# Patient Record
Sex: MALE | Race: WHITE | Employment: OTHER | URBAN - METROPOLITAN AREA
[De-identification: names, ages, dates, MRNs, and addresses within clinical notes are randomized per-mention and may not be internally consistent; named-entity substitution may affect disease eponyms.]

---

## 2018-07-25 ENCOUNTER — TELEPHONE (OUTPATIENT)
Dept: ORTHOPEDIC SURGERY | Age: 78
End: 2018-07-25

## 2018-07-25 DIAGNOSIS — R52 PAIN: Primary | ICD-10-CM

## 2020-06-23 ENCOUNTER — OFFICE VISIT (OUTPATIENT)
Dept: PRIMARY CARE CLINIC | Age: 80
End: 2020-06-23
Payer: MEDICARE

## 2020-06-23 VITALS
OXYGEN SATURATION: 95 % | TEMPERATURE: 98.8 F | SYSTOLIC BLOOD PRESSURE: 138 MMHG | DIASTOLIC BLOOD PRESSURE: 72 MMHG | HEIGHT: 74 IN | WEIGHT: 256 LBS | RESPIRATION RATE: 20 BRPM | BODY MASS INDEX: 32.85 KG/M2 | HEART RATE: 78 BPM

## 2020-06-23 PROBLEM — Z86.73 HISTORY OF CVA (CEREBROVASCULAR ACCIDENT): Status: ACTIVE | Noted: 2020-06-23

## 2020-06-23 PROBLEM — E11.9 DIABETES MELLITUS (HCC): Status: ACTIVE | Noted: 2017-08-01

## 2020-06-23 PROBLEM — J43.9 EMPHYSEMA OF LUNG (HCC): Status: ACTIVE | Noted: 2020-06-23

## 2020-06-23 PROBLEM — I10 ESSENTIAL HYPERTENSION: Status: ACTIVE | Noted: 2020-06-23

## 2020-06-23 PROCEDURE — 99203 OFFICE O/P NEW LOW 30 MIN: CPT | Performed by: FAMILY MEDICINE

## 2020-06-23 RX ORDER — PRAMIPEXOLE DIHYDROCHLORIDE 0.12 MG/1
TABLET ORAL
COMMUNITY
Start: 2020-06-18 | End: 2021-01-20 | Stop reason: SDUPTHER

## 2020-06-23 ASSESSMENT — PATIENT HEALTH QUESTIONNAIRE - PHQ9
2. FEELING DOWN, DEPRESSED OR HOPELESS: 0
1. LITTLE INTEREST OR PLEASURE IN DOING THINGS: 0
SUM OF ALL RESPONSES TO PHQ9 QUESTIONS 1 & 2: 0
SUM OF ALL RESPONSES TO PHQ QUESTIONS 1-9: 0
SUM OF ALL RESPONSES TO PHQ QUESTIONS 1-9: 0

## 2020-06-23 ASSESSMENT — ENCOUNTER SYMPTOMS
NAUSEA: 0
WHEEZING: 0
VOMITING: 0
SHORTNESS OF BREATH: 1

## 2020-06-23 NOTE — PROGRESS NOTES
[address] [facility] presents to the office today for   Chief Complaint   Patient presents with   [address] to [facility] to be closer to his daughter  Shivam Naranjo back to [place] a few weeks ago with his wife (they had split up for 1.5 years ago)    Previous PCP Sejal WILDE    Diabetes  Sugars in [place] 200s  Back in [place] 80s-140s - he is eating healthier and less stress  Lantus 38 units per night  Humalog TID PRN sliding scale    Hypertension  Taking Amlodipine and Losartan  No chest pain or dyspnea    Emphysema  Quit smoking many years  Advair daily and albuterol prn    Stroke 40 years ago  Cannot provide me many details  He describes temporary paralysis    Labs 1 month ago he reports stable in [place]    Review of Systems   Constitutional: Negative for chills and fever. Respiratory: Positive for shortness of breath. Negative for wheezing. Cardiovascular: Negative for chest pain and palpitations. Gastrointestinal: Negative for nausea and vomiting. Genitourinary: Negative for dysuria, hematuria and urgency. Skin: Negative for rash. Neurological: Negative for dizziness and light-headedness. /72   Pulse 78   Temp 98.8 °F (37.1 °C) (Temporal)   Resp 20   Ht 6' 2\" (1.88 m)   Wt 256 lb (116.1 kg)   SpO2 95%   BMI 32.87 kg/m²   Physical Exam  Constitutional:       Appearance: Normal appearance. HENT:      Head: Normocephalic and atraumatic. Nose: Nose normal.      Mouth/Throat:      Mouth: Mucous membranes are moist.      Pharynx: No posterior oropharyngeal erythema. Eyes:      Extraocular Movements: Extraocular movements intact. Conjunctiva/sclera: Conjunctivae normal.   Cardiovascular:      Rate and Rhythm: Normal rate. Heart sounds: Normal heart sounds. Pulmonary:      Effort: Pulmonary effort is normal.      Breath sounds: Wheezing present. Skin:     General: Skin is warm.    Neurological:      Mental Status: He is alert and oriented to person, place, and time. Psychiatric:         Mood and Affect: Mood normal.         Behavior: Behavior normal.            Current Outpatient Medications:     pramipexole (MIRAPEX) 0.125 MG tablet, TAKE 2 TABLETS BY MOUTH 2 TO 3 HOURS BEFORE BEDTIME FOR RESTLESS LEGS, Disp: , Rfl:     insulin NPH (HUMULIN N;NOVOLIN N) 100 UNIT/ML injection vial, Inject into the skin 2 times daily (before meals) 20 units in am and 15 units at night, Disp: , Rfl:     fluticasone-salmeterol (ADVAIR) 500-50 MCG/DOSE diskus inhaler, Inhale 1 puff into the lungs every 12 hours To use dos and bring, Disp: , Rfl:     albuterol (PROVENTIL) (2.5 MG/3ML) 0.083% nebulizer solution, Take 2.5 mg by nebulization every 6 hours as needed for Wheezing To use dos if needs, Disp: , Rfl:     glipiZIDE (GLUCOTROL) 5 MG tablet, Take 5 mg by mouth 2 times daily (before meals), Disp: , Rfl:     gabapentin (NEURONTIN) 300 MG capsule, Take 300 mg by mouth nightly , Disp: , Rfl:     amLODIPine (NORVASC) 5 MG tablet, Take 5 mg by mouth daily Instructed to take with sip water am of procedure, Disp: , Rfl:     potassium chloride (KLOR-CON M) 20 MEQ extended release tablet, Take 20 mEq by mouth 2 times daily, Disp: , Rfl:     atorvastatin (LIPITOR) 20 MG tablet, Take 20 mg by mouth daily, Disp: , Rfl:     losartan (COZAAR) 100 MG tablet, Take 100 mg by mouth daily, Disp: , Rfl:      Past Medical History:   Diagnosis Date    Bronchitis     recent, abx completed, has minimal cough at this time    Carpal tunnel syndrome, right     COPD (chronic obstructive pulmonary disease) (HCC)     Diabetes (HCC)     Foot drop, left     dt nerve damage    Hyperlipidemia     Hypertension     Neuropathy     left leg       Marcellus Burns was seen today for establish care.     Diagnoses and all orders for this visit:    Type 2 diabetes mellitus without complication, with long-term current use of insulin ([facility])  -     Cancel: CBC; Future  -     Cancel: Hemoglobin A1C; Future  - Cancel: Lipid Panel; Future  -     Cancel: TSH without Reflex; Future  -     Cancel: Comprehensive Metabolic Panel; Future  -     CBC; Future  -     Comprehensive Metabolic Panel; Future  -     Hemoglobin A1C; Future  -     Lipid Panel; Future  -     TSH without Reflex; Future    Pulmonary emphysema, unspecified emphysema type ([facility] [address]    Essential hypertension  -     Cancel: Lipid Panel; Future  -     Cancel: Comprehensive Metabolic Panel; Future  -     Comprehensive Metabolic Panel; Future  -     Lipid Panel;  Future    History of CVA (cerebrovascular accident)       Labs and me in 3 months  He reports breathing is at baseline  No longer smoker x many decades  Release of records from previous PCP    Mya Malcolm MD

## 2020-07-24 ENCOUNTER — OFFICE VISIT (OUTPATIENT)
Dept: PRIMARY CARE CLINIC | Age: 80
End: 2020-07-24
Payer: MEDICARE

## 2020-07-24 VITALS
OXYGEN SATURATION: 93 % | SYSTOLIC BLOOD PRESSURE: 154 MMHG | WEIGHT: 254 LBS | HEART RATE: 80 BPM | BODY MASS INDEX: 34.4 KG/M2 | HEIGHT: 72 IN | RESPIRATION RATE: 20 BRPM | DIASTOLIC BLOOD PRESSURE: 64 MMHG | TEMPERATURE: 97.5 F

## 2020-07-24 PROCEDURE — 99214 OFFICE O/P EST MOD 30 MIN: CPT | Performed by: FAMILY MEDICINE

## 2020-07-24 RX ORDER — INSULIN GLARGINE 100 [IU]/ML
38 INJECTION, SOLUTION SUBCUTANEOUS NIGHTLY
Qty: 5 PEN | Refills: 3 | Status: SHIPPED
Start: 2020-07-24 | End: 2020-12-31 | Stop reason: SDUPTHER

## 2020-07-24 RX ORDER — TRIAMCINOLONE ACETONIDE 1 MG/G
CREAM TOPICAL
Qty: 15 G | Refills: 1 | Status: SHIPPED
Start: 2020-07-24 | End: 2021-01-20

## 2020-07-24 NOTE — PROGRESS NOTES
[address] [facility] presents to the office today for   Chief Complaint   Patient presents with    Hypertension    Diabetes    Follow-up     Legs and feet  with itchy rash  X few days  He thinks poison ivy  Weight is stable  No orthopnea  No pain  Swelling at baseline  He reports negative dopplers recently at [address] - I do not have records    Review of Systems        BP (!) 154/64 (Site: Left Upper Arm, Position: Sitting, Cuff Size: Medium Adult)   Pulse 80   Temp 97.5 °F (36.4 °C) (Temporal)   Resp 20   Ht 6' (1.829 m)   Wt 254 lb (115.2 kg)   SpO2 93%   BMI 34.45 kg/m²   Physical Exam  Constitutional:       Appearance: Normal appearance. HENT:      Head: Normocephalic and atraumatic. Nose: Nose normal.      Mouth/Throat:      Mouth: Mucous membranes are moist.      Pharynx: No posterior oropharyngeal erythema. Eyes:      Extraocular Movements: Extraocular movements intact. Conjunctiva/sclera: Conjunctivae normal.   Cardiovascular:      Rate and Rhythm: Normal rate. Heart sounds: Normal heart sounds. Pulmonary:      Effort: Pulmonary effort is normal.      Breath sounds: Normal breath sounds. Skin:     General: Skin is warm. Neurological:      Mental Status: He is alert and oriented to person, place, and time. Psychiatric:         Mood and Affect: Mood normal.         Behavior: Behavior normal.            Current Outpatient Medications:     insulin glargine (LANTUS SOLOSTAR) 100 UNIT/ML injection pen, Inject 38 Units into the skin nightly, Disp: 5 pen, Rfl: 3    triamcinolone (KENALOG) 0.1 % cream, Apply topically 2 times daily. , Disp: 15 g, Rfl: 1    pramipexole (MIRAPEX) 0.125 MG tablet, TAKE 2 TABLETS BY MOUTH 2 TO 3 HOURS BEFORE BEDTIME FOR RESTLESS LEGS, Disp: , Rfl:     insulin NPH (HUMULIN N;NOVOLIN N) 100 UNIT/ML injection vial, Inject into the skin 2 times daily (before meals) 20 units in am and 15 units at night, Disp: , Rfl:    fluticasone-salmeterol (ADVAIR) 500-50 MCG/DOSE diskus inhaler, Inhale 1 puff into the lungs every 12 hours To use dos and bring, Disp: , Rfl:     albuterol (PROVENTIL) (2.5 MG/3ML) 0.083% nebulizer solution, Take 2.5 mg by nebulization every 6 hours as needed for Wheezing To use dos if needs, Disp: , Rfl:     glipiZIDE (GLUCOTROL) 5 MG tablet, Take 5 mg by mouth 2 times daily (before meals), Disp: , Rfl:     gabapentin (NEURONTIN) 300 MG capsule, Take 300 mg by mouth nightly , Disp: , Rfl:     amLODIPine (NORVASC) 5 MG tablet, Take 5 mg by mouth daily Instructed to take with sip water am of procedure, Disp: , Rfl:     potassium chloride (KLOR-CON M) 20 MEQ extended release tablet, Take 20 mEq by mouth 2 times daily, Disp: , Rfl:     atorvastatin (LIPITOR) 20 MG tablet, Take 20 mg by mouth daily, Disp: , Rfl:     losartan (COZAAR) 100 MG tablet, Take 100 mg by mouth daily, Disp: , Rfl:      Past Medical History:   Diagnosis Date    Bronchitis     recent, abx completed, has minimal cough at this time    Carpal tunnel syndrome, right     COPD (chronic obstructive pulmonary disease) (HCC)     Diabetes ([facility] [address]     Foot drop, left     dt nerve damage    Hyperlipidemia     Hypertension     Neuropathy     left leg       Eloisa Shearer was seen today for hypertension, diabetes and follow-up. Diagnoses and all orders for this visit:    Poison ivy  -     triamcinolone (KENALOG) 0.1 % cream; Apply topically 2 times daily. Essential hypertension    Other orders  -     insulin glargine (LANTUS SOLOSTAR) 100 UNIT/ML injection pen;  Inject 38 Units into the skin nightly       Seems c/w poison ivy  Trial topical triamcinolone  Blood pressure mildly elevated so will defer oral steroids  See me in 1 week for recheck on blood pressure and rash    Micah Moore MD

## 2020-08-19 ENCOUNTER — HOSPITAL ENCOUNTER (OUTPATIENT)
Age: 80
Discharge: HOME | End: 2020-08-21
Payer: MEDICARE

## 2020-08-19 LAB
ALBUMIN SERPL-MCNC: 3.7 G/DL (ref 3.5–5.2)
ALP BLD-CCNC: 68 U/L (ref 40–129)
ALT SERPL-CCNC: 13 U/L (ref 0–40)
ANION GAP SERPL CALCULATED.3IONS-SCNC: 15 MMOL/L (ref 7–16)
AST SERPL-CCNC: 18 U/L (ref 0–39)
BILIRUB SERPL-MCNC: 0.4 MG/DL (ref 0–1.2)
BUN BLDV-MCNC: 30 MG/DL (ref 8–23)
CALCIUM SERPL-MCNC: 8.8 MG/DL (ref 8.6–10.2)
CHLORIDE BLD-SCNC: 105 MMOL/L (ref 98–107)
CHOLESTEROL, TOTAL: 107 MG/DL (ref 0–199)
CO2: 23 MMOL/L (ref 22–29)
CREAT SERPL-MCNC: 2 MG/DL (ref 0.7–1.2)
GFR AFRICAN AMERICAN: 39
GFR NON-AFRICAN AMERICAN: 32 ML/MIN/1.73
GLUCOSE BLD-MCNC: 145 MG/DL (ref 74–99)
HBA1C MFR BLD: 7.1 % (ref 4–5.6)
HCT VFR BLD CALC: 38 % (ref 37–54)
HDLC SERPL-MCNC: 43 MG/DL
HEMOGLOBIN: 11.9 G/DL (ref 12.5–16.5)
LDL CHOLESTEROL CALCULATED: 41 MG/DL (ref 0–99)
MCH RBC QN AUTO: 28.2 PG (ref 26–35)
MCHC RBC AUTO-ENTMCNC: 31.3 % (ref 32–34.5)
MCV RBC AUTO: 90 FL (ref 80–99.9)
PDW BLD-RTO: 15.4 FL (ref 11.5–15)
PLATELET # BLD: 196 E9/L (ref 130–450)
PMV BLD AUTO: 11.3 FL (ref 7–12)
POTASSIUM SERPL-SCNC: 4.1 MMOL/L (ref 3.5–5)
RBC # BLD: 4.22 E12/L (ref 3.8–5.8)
SODIUM BLD-SCNC: 143 MMOL/L (ref 132–146)
TOTAL PROTEIN: 6.4 G/DL (ref 6.4–8.3)
TRIGL SERPL-MCNC: 113 MG/DL (ref 0–149)
TSH SERPL DL<=0.05 MIU/L-ACNC: 3.76 UIU/ML (ref 0.27–4.2)
VLDLC SERPL CALC-MCNC: 23 MG/DL
WBC # BLD: 7.1 E9/L (ref 4.5–11.5)

## 2020-08-19 PROCEDURE — 84443 ASSAY THYROID STIM HORMONE: CPT

## 2020-08-19 PROCEDURE — 83036 HEMOGLOBIN GLYCOSYLATED A1C: CPT

## 2020-08-19 PROCEDURE — 85027 COMPLETE CBC AUTOMATED: CPT

## 2020-08-19 PROCEDURE — 80053 COMPREHEN METABOLIC PANEL: CPT

## 2020-08-19 PROCEDURE — 80061 LIPID PANEL: CPT

## 2020-08-19 PROCEDURE — 36415 COLL VENOUS BLD VENIPUNCTURE: CPT

## 2020-08-26 ENCOUNTER — OFFICE VISIT (OUTPATIENT)
Dept: PRIMARY CARE CLINIC | Age: 80
End: 2020-08-26
Payer: MEDICARE

## 2020-08-26 ENCOUNTER — HOSPITAL ENCOUNTER (OUTPATIENT)
Age: 80
Discharge: HOME | End: 2020-08-28
Payer: MEDICARE

## 2020-08-26 VITALS
WEIGHT: 254 LBS | OXYGEN SATURATION: 98 % | BODY MASS INDEX: 34.4 KG/M2 | RESPIRATION RATE: 18 BRPM | SYSTOLIC BLOOD PRESSURE: 175 MMHG | TEMPERATURE: 98.4 F | HEIGHT: 72 IN | DIASTOLIC BLOOD PRESSURE: 78 MMHG | HEART RATE: 69 BPM

## 2020-08-26 LAB
ANION GAP SERPL CALCULATED.3IONS-SCNC: 17 MMOL/L (ref 7–16)
BUN BLDV-MCNC: 29 MG/DL (ref 8–23)
CALCIUM SERPL-MCNC: 9.6 MG/DL (ref 8.6–10.2)
CHLORIDE BLD-SCNC: 103 MMOL/L (ref 98–107)
CO2: 23 MMOL/L (ref 22–29)
CREAT SERPL-MCNC: 2 MG/DL (ref 0.7–1.2)
GFR AFRICAN AMERICAN: 39
GFR NON-AFRICAN AMERICAN: 32 ML/MIN/1.73
GLUCOSE BLD-MCNC: 232 MG/DL (ref 74–99)
POTASSIUM SERPL-SCNC: 4.8 MMOL/L (ref 3.5–5)
PRO-BNP: 1815 PG/ML (ref 0–450)
SODIUM BLD-SCNC: 143 MMOL/L (ref 132–146)

## 2020-08-26 PROCEDURE — 3051F HG A1C>EQUAL 7.0%<8.0%: CPT | Performed by: FAMILY MEDICINE

## 2020-08-26 PROCEDURE — 80048 BASIC METABOLIC PNL TOTAL CA: CPT

## 2020-08-26 PROCEDURE — 83880 ASSAY OF NATRIURETIC PEPTIDE: CPT

## 2020-08-26 PROCEDURE — 99214 OFFICE O/P EST MOD 30 MIN: CPT | Performed by: FAMILY MEDICINE

## 2020-08-26 RX ORDER — AMLODIPINE BESYLATE 10 MG/1
10 TABLET ORAL DAILY
Qty: 90 TABLET | Refills: 1 | Status: SHIPPED
Start: 2020-08-26 | End: 2021-01-20 | Stop reason: SDUPTHER

## 2020-08-26 ASSESSMENT — ENCOUNTER SYMPTOMS
NAUSEA: 0
WHEEZING: 0
VOMITING: 0
SHORTNESS OF BREATH: 0

## 2020-08-26 NOTE — PROGRESS NOTES
[facility]    Americo Suresh presents to the office today for   Chief Complaint   Patient presents with    Check-Up     3 month check up for chronic hyperlipidemia,diabetes,and HTN.  Hip Pain     States that his hip has a sharp pain in it that shoots down his leg and can make his leg feel numb, states that it has been going on for awhile but has really been worse over the past month. He was geting injection in his hip in [facility],and the injections seemed to help with his pain level.  Medication Refill     Diabetes w/CKD  He has been told hx of CKD in past  Unclear baseline creatinine  A1C 7.1    Hypertension  No chest pain or dyspnea  Taking meds as Rx  No outside readings    R hip pain  Lately really bad  He was getting injections in [place] that were helping  Sounds like bursitis as he says he cannot lay on that side    Leg swelling  no orthopnea  Stable  No recent BNP    Rash on legs cleared up    Review of Systems   Constitutional: Negative for chills and fever. Respiratory: Negative for shortness of breath and wheezing. Cardiovascular: Negative for chest pain and palpitations. Gastrointestinal: Negative for nausea and vomiting. Genitourinary: Negative for dysuria, hematuria and urgency. Skin: Negative for rash. Neurological: Negative for dizziness and light-headedness. BP (!) 175/78   Pulse 69   Temp 98.4 °F (36.9 °C)   Resp 18   Ht 6' (1.829 m)   Wt 254 lb (115.2 kg)   SpO2 98%   BMI 34.45 kg/m²   Physical Exam  Constitutional:       Appearance: Normal appearance. HENT:      Head: Normocephalic and atraumatic. Nose: Nose normal.      Mouth/Throat:      Mouth: Mucous membranes are moist.      Pharynx: No posterior oropharyngeal erythema. Eyes:      Extraocular Movements: Extraocular movements intact. Conjunctiva/sclera: Conjunctivae normal.   Cardiovascular:      Rate and Rhythm: Normal rate. Heart sounds: Normal heart sounds. Pulmonary:      Effort: Pulmonary effort is normal.      Breath sounds: Normal breath sounds. Skin:     General: Skin is warm. Neurological:      Mental Status: He is alert and oriented to person, place, and time. Psychiatric:         Mood and Affect: Mood normal.         Behavior: Behavior normal.            Current Outpatient Medications:     blood glucose test strips (ASCENSIA AUTODISC VI;ONE TOUCH ULTRA TEST VI) strip, 1 each by In Vitro route 4 times daily As needed. , Disp: 300 strip, Rfl: 1    amLODIPine (NORVASC) 10 MG tablet, Take 1 tablet by mouth daily, Disp: 90 tablet, Rfl: 1    insulin glargine (LANTUS SOLOSTAR) 100 UNIT/ML injection pen, Inject 38 Units into the skin nightly, Disp: 5 pen, Rfl: 3    triamcinolone (KENALOG) 0.1 % cream, Apply topically 2 times daily. , Disp: 15 g, Rfl: 1    pramipexole (MIRAPEX) 0.125 MG tablet, TAKE 2 TABLETS BY MOUTH 2 TO 3 HOURS BEFORE BEDTIME FOR RESTLESS LEGS, Disp: , Rfl:     insulin NPH (HUMULIN N;NOVOLIN N) 100 UNIT/ML injection vial, Inject into the skin 2 times daily (before meals) 20 units in am and 15 units at night, Disp: , Rfl:     fluticasone-salmeterol (ADVAIR) 500-50 MCG/DOSE diskus inhaler, Inhale 1 puff into the lungs every 12 hours To use dos and bring, Disp: , Rfl:     albuterol (PROVENTIL) (2.5 MG/3ML) 0.083% nebulizer solution, Take 2.5 mg by nebulization every 6 hours as needed for Wheezing To use dos if needs, Disp: , Rfl:     glipiZIDE (GLUCOTROL) 5 MG tablet, Take 5 mg by mouth 2 times daily (before meals), Disp: , Rfl:     gabapentin (NEURONTIN) 300 MG capsule, Take 300 mg by mouth nightly , Disp: , Rfl:     potassium chloride (KLOR-CON M) 20 MEQ extended release tablet, Take 20 mEq by mouth 2 times daily, Disp: , Rfl:     atorvastatin (LIPITOR) 20 MG tablet, Take 20 mg by mouth daily, Disp: , Rfl:     losartan (COZAAR) 100 MG tablet, Take 100 mg by mouth daily, Disp: , Rfl:      Past Medical History:   Diagnosis Date    Bronchitis     recent, abx completed, has minimal cough at this time    Carpal tunnel syndrome, right     [facility]     Diabetes ([facility] [address]     Foot drop, left     dt nerve damage    Hyperlipidemia     Hypertension     Neuropathy     left leg       Eloisa Shearer was seen today for check-up, hip pain and medication refill. Diagnoses and all orders for this visit:    Essential hypertension  -     amLODIPine (NORVASC) 10 MG tablet; Take 1 tablet by mouth daily    Right hip pain  -     External Referral To Orthopedic Surgery    Type 2 diabetes mellitus without complication, with long-term current use of insulin ([facility])    History of CVA (cerebrovascular accident)    Leg swelling  -     Basic Metabolic Panel; Future  -     BRAIN NATRIURETIC PEPTIDE; Future    CKD (chronic kidney disease) stage 3, GFR 30-59 ml/min ([facility])  -     Basic Metabolic Panel; Future  -     BRAIN NATRIURETIC PEPTIDE; Future    Other orders  -     blood glucose test strips (ASCENSIA AUTODISC VI;ONE TOUCH ULTRA TEST VI) strip; 1 each by In Vitro route 4 times daily As needed.        Increase Amlodipine 10 mg  Check labs today to confirm Creatinine is at baseline  Recheck BP 1 month  Consider switching Amlodipine to HCTZ if leg swelling worsens  Referral to orthopedics  Tylenol only - No NSAIDs - pt aware    Micah Moore MD

## 2020-08-28 RX ORDER — FUROSEMIDE 20 MG/1
20 TABLET ORAL DAILY
Qty: 7 TABLET | Refills: 0 | Status: SHIPPED
Start: 2020-08-28 | End: 2020-09-02 | Stop reason: SDUPTHER

## 2020-09-01 ENCOUNTER — TELEPHONE (OUTPATIENT)
Dept: PRIMARY CARE CLINIC | Age: 80
End: 2020-09-01

## 2020-09-02 RX ORDER — FUROSEMIDE 20 MG/1
20 TABLET ORAL DAILY
Qty: 7 TABLET | Refills: 0 | Status: SHIPPED
Start: 2020-09-02 | End: 2020-09-18 | Stop reason: SDUPTHER

## 2020-09-09 LAB
LEFT VENTRICULAR EJECTION FRACTION HIGH VALUE: NORMAL
LEFT VENTRICULAR EJECTION FRACTION MODE: NORMAL
LV EF: NORMAL %

## 2020-09-16 ENCOUNTER — HOSPITAL ENCOUNTER (OUTPATIENT)
Age: 80
Discharge: HOME | End: 2020-09-18
Payer: MEDICARE

## 2020-09-16 ENCOUNTER — OFFICE VISIT (OUTPATIENT)
Dept: PRIMARY CARE CLINIC | Age: 80
End: 2020-09-16
Payer: MEDICARE

## 2020-09-16 VITALS
RESPIRATION RATE: 18 BRPM | WEIGHT: 255 LBS | BODY MASS INDEX: 34.54 KG/M2 | DIASTOLIC BLOOD PRESSURE: 93 MMHG | TEMPERATURE: 98.2 F | SYSTOLIC BLOOD PRESSURE: 162 MMHG | HEIGHT: 72 IN | HEART RATE: 72 BPM | OXYGEN SATURATION: 98 %

## 2020-09-16 PROCEDURE — 83880 ASSAY OF NATRIURETIC PEPTIDE: CPT

## 2020-09-16 PROCEDURE — 36415 COLL VENOUS BLD VENIPUNCTURE: CPT | Performed by: FAMILY MEDICINE

## 2020-09-16 PROCEDURE — 99213 OFFICE O/P EST LOW 20 MIN: CPT | Performed by: FAMILY MEDICINE

## 2020-09-16 PROCEDURE — 36415 COLL VENOUS BLD VENIPUNCTURE: CPT

## 2020-09-16 PROCEDURE — 80048 BASIC METABOLIC PNL TOTAL CA: CPT

## 2020-09-16 ASSESSMENT — ENCOUNTER SYMPTOMS
VOMITING: 0
SHORTNESS OF BREATH: 1
WHEEZING: 0
NAUSEA: 0

## 2020-09-16 NOTE — PROGRESS NOTES
[facility]    Gabi Lomas presents to the office today for   Chief Complaint   Patient presents with    1 Month Follow-Up     To discuss the echocardiogram results.  Discuss Labs    Other     He is having alot of pain in his right hip and knee, states that he can feel fluid on his knee. States that he was suppose to see an orthopedic doctor but she never heard anything back. States that he wants to see someone in [place], mentioned Dr. Horace Sales. Diastolic heart failure/aortic sclerosis  Seen on most recent echo  Still short of breath  He took Lasix 20 mg for a week  He ran out and isn't on it anymore  appt delayed later than I wished  Weight stable from last appt    Did not hear about orthopedics referral  Seeks new referral to Dr. Horace Sales in [place]    Review of Systems   Constitutional: Negative for chills and fever. Respiratory: Positive for shortness of breath. Negative for wheezing. Cardiovascular: Negative for chest pain and palpitations. Gastrointestinal: Negative for nausea and vomiting. Genitourinary: Negative for dysuria, hematuria and urgency. Skin: Negative for rash. Neurological: Negative for dizziness and light-headedness. BP (!) 162/93   Pulse 72   Temp 98.2 °F (36.8 °C)   Resp 18   Ht 6' (1.829 m)   Wt 255 lb (115.7 kg)   SpO2 98%   BMI 34.58 kg/m²   Physical Exam  Constitutional:       Appearance: Normal appearance. HENT:      Head: Normocephalic and atraumatic. Nose: Nose normal.      Mouth/Throat:      Mouth: Mucous membranes are moist.      Pharynx: No posterior oropharyngeal erythema. Eyes:      Extraocular Movements: Extraocular movements intact. Conjunctiva/sclera: Conjunctivae normal.   Cardiovascular:      Rate and Rhythm: Normal rate. Heart sounds: Normal heart sounds. Comments: 2+ pitting edema bilaterally  Pulmonary:      Effort: Pulmonary effort is normal.   Skin:     General: Skin is warm.    Neurological:      Mental Status: He is alert and oriented to person, place, and time. Psychiatric:         Mood and Affect: Mood normal.         Behavior: Behavior normal.            Current Outpatient Medications:     furosemide (LASIX) 20 MG tablet, Take 1 tablet by mouth daily, Disp: 7 tablet, Rfl: 0    Handicap Placard MISC, by Does not apply route, Disp: 1 each, Rfl: 0    blood glucose test strips (ASCENSIA AUTODISC VI;ONE TOUCH ULTRA TEST VI) strip, 1 each by In Vitro route 4 times daily As needed. , Disp: 300 strip, Rfl: 1    amLODIPine (NORVASC) 10 MG tablet, Take 1 tablet by mouth daily, Disp: 90 tablet, Rfl: 1    insulin glargine (LANTUS SOLOSTAR) 100 UNIT/ML injection pen, Inject 38 Units into the skin nightly, Disp: 5 pen, Rfl: 3    triamcinolone (KENALOG) 0.1 % cream, Apply topically 2 times daily. , Disp: 15 g, Rfl: 1    pramipexole (MIRAPEX) 0.125 MG tablet, TAKE 2 TABLETS BY MOUTH 2 TO 3 HOURS BEFORE BEDTIME FOR RESTLESS LEGS, Disp: , Rfl:     insulin NPH (HUMULIN N;NOVOLIN N) 100 UNIT/ML injection vial, Inject into the skin 2 times daily (before meals) 20 units in am and 15 units at night, Disp: , Rfl:     fluticasone-salmeterol (ADVAIR) 500-50 MCG/DOSE diskus inhaler, Inhale 1 puff into the lungs every 12 hours To use dos and bring, Disp: , Rfl:     albuterol (PROVENTIL) (2.5 MG/3ML) 0.083% nebulizer solution, Take 2.5 mg by nebulization every 6 hours as needed for Wheezing To use dos if needs, Disp: , Rfl:     glipiZIDE (GLUCOTROL) 5 MG tablet, Take 5 mg by mouth 2 times daily (before meals), Disp: , Rfl:     gabapentin (NEURONTIN) 300 MG capsule, Take 300 mg by mouth nightly , Disp: , Rfl:     potassium chloride (KLOR-CON M) 20 MEQ extended release tablet, Take 20 mEq by mouth 2 times daily, Disp: , Rfl:     atorvastatin (LIPITOR) 20 MG tablet, Take 20 mg by mouth daily, Disp: , Rfl:     losartan (COZAAR) 100 MG tablet, Take 100 mg by mouth daily, Disp: , Rfl:      Past Medical History:   Diagnosis Date  Bronchitis     recent, abx completed, has minimal cough at this time    Carpal tunnel syndrome, right     [facility]     Diabetes ([facility] [address]     Foot drop, left     dt nerve damage    Hyperlipidemia     Hypertension     Neuropathy     left leg       Davey Jones was seen today for 1 month follow-up, discuss labs and other.     Diagnoses and all orders for this visit:    Right hip pain  -     External Referral To Orthopedic Surgery    Chronic pain of right knee  -     External Referral To Orthopedic Surgery    Acute on chronic diastolic heart failure [facility])  -     External Referral To Cardiology    Aortic valve sclerosis  -     External Referral To Cardiology       Referral to Cardiology  Referral to orthopedics  Draw labs today  Resume Henry Rodriguez MD

## 2020-09-17 LAB
ANION GAP SERPL CALCULATED.3IONS-SCNC: 10 MMOL/L (ref 7–16)
BUN BLDV-MCNC: 29 MG/DL (ref 8–23)
CALCIUM SERPL-MCNC: 9.7 MG/DL (ref 8.6–10.2)
CHLORIDE BLD-SCNC: 105 MMOL/L (ref 98–107)
CO2: 29 MMOL/L (ref 22–29)
CREAT SERPL-MCNC: 2.1 MG/DL (ref 0.7–1.2)
GFR AFRICAN AMERICAN: 37
GFR NON-AFRICAN AMERICAN: 31 ML/MIN/1.73
GLUCOSE BLD-MCNC: 59 MG/DL (ref 74–99)
POTASSIUM SERPL-SCNC: 4.7 MMOL/L (ref 3.5–5)
PRO-BNP: 2654 PG/ML (ref 0–450)
SODIUM BLD-SCNC: 144 MMOL/L (ref 132–146)

## 2020-09-18 RX ORDER — FUROSEMIDE 20 MG/1
20 TABLET ORAL DAILY
Qty: 30 TABLET | Refills: 0 | Status: SHIPPED
Start: 2020-09-18 | End: 2020-10-01 | Stop reason: SDUPTHER

## 2020-09-23 ENCOUNTER — TELEPHONE (OUTPATIENT)
Dept: PRIMARY CARE CLINIC | Age: 80
End: 2020-09-23

## 2020-09-29 ENCOUNTER — HOSPITAL ENCOUNTER (OUTPATIENT)
Age: 80
Discharge: HOME | End: 2020-10-01
Payer: MEDICARE

## 2020-09-29 ENCOUNTER — NURSE ONLY (OUTPATIENT)
Dept: PRIMARY CARE CLINIC | Age: 80
End: 2020-09-29
Payer: MEDICARE

## 2020-09-29 PROCEDURE — 80048 BASIC METABOLIC PNL TOTAL CA: CPT

## 2020-09-29 PROCEDURE — 36415 COLL VENOUS BLD VENIPUNCTURE: CPT

## 2020-09-29 PROCEDURE — 36415 COLL VENOUS BLD VENIPUNCTURE: CPT | Performed by: FAMILY MEDICINE

## 2020-09-30 LAB
ANION GAP SERPL CALCULATED.3IONS-SCNC: 16 MMOL/L (ref 7–16)
BUN BLDV-MCNC: 27 MG/DL (ref 8–23)
CALCIUM SERPL-MCNC: 9.4 MG/DL (ref 8.6–10.2)
CHLORIDE BLD-SCNC: 102 MMOL/L (ref 98–107)
CO2: 23 MMOL/L (ref 22–29)
CREAT SERPL-MCNC: 2.1 MG/DL (ref 0.7–1.2)
GFR AFRICAN AMERICAN: 37
GFR NON-AFRICAN AMERICAN: 31 ML/MIN/1.73
GLUCOSE BLD-MCNC: 154 MG/DL (ref 74–99)
POTASSIUM SERPL-SCNC: 4.2 MMOL/L (ref 3.5–5)
SODIUM BLD-SCNC: 141 MMOL/L (ref 132–146)

## 2020-10-01 RX ORDER — FUROSEMIDE 20 MG/1
20 TABLET ORAL DAILY
Qty: 30 TABLET | Refills: 5 | Status: SHIPPED
Start: 2020-10-01 | End: 2021-01-20 | Stop reason: SDUPTHER

## 2020-10-02 ENCOUNTER — TELEPHONE (OUTPATIENT)
Dept: PRIMARY CARE CLINIC | Age: 80
End: 2020-10-02

## 2020-10-02 NOTE — TELEPHONE ENCOUNTER
Received a letter from the renal group saying patient declined to schedule with them, can you confirm he has another nephrology appt set up?

## 2020-10-02 NOTE — TELEPHONE ENCOUNTER
Eda Lopez said this group no longer goes to [facility] and he did not want to drive to [place]. She scheduled him for a group in [place].   They are to be calling him

## 2020-10-21 ENCOUNTER — OFFICE VISIT (OUTPATIENT)
Dept: PRIMARY CARE CLINIC | Age: 80
End: 2020-10-21
Payer: MEDICARE

## 2020-10-21 VITALS
HEIGHT: 72 IN | RESPIRATION RATE: 18 BRPM | HEART RATE: 66 BPM | WEIGHT: 237 LBS | BODY MASS INDEX: 32.1 KG/M2 | TEMPERATURE: 97.9 F | SYSTOLIC BLOOD PRESSURE: 140 MMHG | OXYGEN SATURATION: 98 % | DIASTOLIC BLOOD PRESSURE: 75 MMHG

## 2020-10-21 PROCEDURE — 99213 OFFICE O/P EST LOW 20 MIN: CPT | Performed by: FAMILY MEDICINE

## 2020-10-21 PROCEDURE — G0008 ADMIN INFLUENZA VIRUS VAC: HCPCS | Performed by: FAMILY MEDICINE

## 2020-10-21 PROCEDURE — 90694 VACC AIIV4 NO PRSRV 0.5ML IM: CPT | Performed by: FAMILY MEDICINE

## 2020-10-21 RX ORDER — TRAZODONE HYDROCHLORIDE 150 MG/1
TABLET ORAL
COMMUNITY
End: 2021-01-20 | Stop reason: SDUPTHER

## 2020-10-21 RX ORDER — GABAPENTIN 600 MG/1
TABLET ORAL
COMMUNITY
Start: 2020-10-02 | End: 2021-01-20

## 2020-10-21 RX ORDER — HYDRALAZINE HYDROCHLORIDE 25 MG/1
TABLET, FILM COATED ORAL
COMMUNITY
End: 2020-12-31 | Stop reason: SDUPTHER

## 2020-10-21 RX ORDER — INSULIN LISPRO 100 [IU]/ML
INJECTION, SOLUTION INTRAVENOUS; SUBCUTANEOUS
COMMUNITY
End: 2021-01-20 | Stop reason: SDUPTHER

## 2020-10-21 ASSESSMENT — ENCOUNTER SYMPTOMS
VOMITING: 0
WHEEZING: 0
NAUSEA: 0
SHORTNESS OF BREATH: 0

## 2020-10-21 NOTE — PROGRESS NOTES
[facility]    Chitra Cantor presents to the office today for   Chief Complaint   Patient presents with   [facility] ED Follow-up     States that he went to [facility] on 10/[facility], because he took the wrong insulin. ER follow up  He took his short acting insulin instead of long acting  He realized it right away and went to ER  He was hypoglycemic and was treatment with D5 fluids  Monitored him overnight and sent him home when sugars stablized  He is feeling better    Review of Systems   Constitutional: Negative for chills and fever. Respiratory: Negative for shortness of breath and wheezing. Cardiovascular: Negative for chest pain and palpitations. Gastrointestinal: Negative for nausea and vomiting. Genitourinary: Negative for dysuria, hematuria and urgency. Skin: Negative for rash. Neurological: Negative for dizziness and light-headedness. BP (!) 140/75   Pulse 66   Temp 97.9 °F (36.6 °C)   Resp 18   Ht 6' (1.829 m)   Wt 237 lb (107.5 kg)   SpO2 98%   BMI 32.14 kg/m²   Physical Exam  Constitutional:       Appearance: Normal appearance. HENT:      Head: Normocephalic and atraumatic. Eyes:      Extraocular Movements: Extraocular movements intact. Conjunctiva/sclera: Conjunctivae normal.   Cardiovascular:      Rate and Rhythm: Normal rate. Pulmonary:      Effort: Pulmonary effort is normal.   Skin:     General: Skin is warm. Neurological:      Mental Status: He is alert and oriented to person, place, and time.    Psychiatric:         Mood and Affect: Mood normal.         Behavior: Behavior normal.            Current Outpatient Medications:     gabapentin (NEURONTIN) 600 MG tablet, take 1 tablet by mouth NIGHTLY, Disp: , Rfl:     hydrALAZINE (APRESOLINE) 25 MG tablet, hydralazine 25 mg tablet, Disp: , Rfl:     insulin lispro, 1 Unit Dial, 100 UNIT/ML SOPN, insulin lispro (U-100) 100 unit/mL subcutaneous pen, Disp: , Rfl:     traZODone (DESYREL) 150 MG tablet, trazodone 150 mg tablet, Disp: , Rfl:     furosemide (LASIX) 20 MG tablet, Take 1 tablet by mouth daily, Disp: 30 tablet, Rfl: 5    Handicap Placard MISC, by Does not apply route X 5 years, Disp: 1 each, Rfl: 0    Handicap Placard MISC, by Does not apply route, Disp: 1 each, Rfl: 0    blood glucose test strips (ASCENSIA AUTODISC VI;ONE TOUCH ULTRA TEST VI) strip, 1 each by In Vitro route 4 times daily As needed. , Disp: 300 strip, Rfl: 1    amLODIPine (NORVASC) 10 MG tablet, Take 1 tablet by mouth daily, Disp: 90 tablet, Rfl: 1    insulin glargine (LANTUS SOLOSTAR) 100 UNIT/ML injection pen, Inject 38 Units into the skin nightly, Disp: 5 pen, Rfl: 3    triamcinolone (KENALOG) 0.1 % cream, Apply topically 2 times daily. , Disp: 15 g, Rfl: 1    pramipexole (MIRAPEX) 0.125 MG tablet, TAKE 2 TABLETS BY MOUTH 2 TO 3 HOURS BEFORE BEDTIME FOR RESTLESS LEGS, Disp: , Rfl:     insulin NPH (HUMULIN N;NOVOLIN N) 100 UNIT/ML injection vial, Inject into the skin 2 times daily (before meals) 20 units in am and 15 units at night, Disp: , Rfl:     fluticasone-salmeterol (ADVAIR) 500-50 MCG/DOSE diskus inhaler, Inhale 1 puff into the lungs every 12 hours To use dos and bring, Disp: , Rfl:     albuterol (PROVENTIL) (2.5 MG/3ML) 0.083% nebulizer solution, Take 2.5 mg by nebulization every 6 hours as needed for Wheezing To use dos if needs, Disp: , Rfl:     glipiZIDE (GLUCOTROL) 5 MG tablet, Take 5 mg by mouth 2 times daily (before meals), Disp: , Rfl:     gabapentin (NEURONTIN) 300 MG capsule, Take 300 mg by mouth nightly , Disp: , Rfl:     potassium chloride (KLOR-CON M) 20 MEQ extended release tablet, Take 20 mEq by mouth 2 times daily, Disp: , Rfl:     atorvastatin (LIPITOR) 20 MG tablet, Take 20 mg by mouth daily, Disp: , Rfl:     losartan (COZAAR) 100 MG tablet, Take 100 mg by mouth daily, Disp: , Rfl:      Past Medical History:   Diagnosis Date    Bronchitis     recent, abx completed, has minimal cough at this time   [place] Carpal tunnel syndrome, right     COPD (chronic obstructive pulmonary disease) (HCC)     Diabetes (HCC)     Foot drop, left     dt nerve damage    Hyperlipidemia     Hypertension     Neuropathy     left leg       Desharris George was seen today for ed follow-up.     Diagnoses and all orders for this visit:    Hypoglycemia    Other orders  -     INFLUENZA, QUADV, ADJUVANTED, 65 YRS =, IM, PF, PREFILL SYR, 0.5ML (FLUAD)       Resolved  He is aware of the error  Confident he can give himself insulin  Flu shot today    Julisa Patton MD

## 2020-12-11 ENCOUNTER — TELEPHONE (OUTPATIENT)
Dept: PRIMARY CARE CLINIC | Age: 80
End: 2020-12-11

## 2020-12-11 NOTE — TELEPHONE ENCOUNTER
[facility] [employer] CALLED TO VERIFY IF THERE ARE ANY MAJOR CONCERNS REGARDING PT BEING ABLE TO LIVE ALONE. [facility].

## 2020-12-16 ENCOUNTER — TELEPHONE (OUTPATIENT)
Dept: PRIMARY CARE CLINIC | Age: 80
End: 2020-12-16

## 2020-12-31 RX ORDER — GABAPENTIN 300 MG/1
300 CAPSULE ORAL NIGHTLY
Qty: 30 CAPSULE | Refills: 0 | Status: SHIPPED
Start: 2020-12-31 | End: 2021-01-20 | Stop reason: SDUPTHER

## 2020-12-31 RX ORDER — INSULIN GLARGINE 100 [IU]/ML
38 INJECTION, SOLUTION SUBCUTANEOUS NIGHTLY
Qty: 5 PEN | Refills: 3 | Status: SHIPPED
Start: 2020-12-31 | End: 2021-01-20 | Stop reason: SDUPTHER

## 2020-12-31 RX ORDER — ATORVASTATIN CALCIUM 20 MG/1
20 TABLET, FILM COATED ORAL DAILY
Qty: 30 TABLET | Refills: 0 | Status: SHIPPED
Start: 2020-12-31 | End: 2021-01-20 | Stop reason: SDUPTHER

## 2020-12-31 RX ORDER — HYDRALAZINE HYDROCHLORIDE 25 MG/1
25 TABLET, FILM COATED ORAL 3 TIMES DAILY
Qty: 90 TABLET | Refills: 0 | Status: SHIPPED
Start: 2020-12-31 | End: 2021-01-20 | Stop reason: SDUPTHER

## 2021-01-20 ENCOUNTER — TELEPHONE (OUTPATIENT)
Dept: PRIMARY CARE CLINIC | Age: 81
End: 2021-01-20

## 2021-01-20 ENCOUNTER — OFFICE VISIT (OUTPATIENT)
Dept: PRIMARY CARE CLINIC | Age: 81
End: 2021-01-20
Payer: MEDICARE

## 2021-01-20 VITALS
DIASTOLIC BLOOD PRESSURE: 75 MMHG | OXYGEN SATURATION: 98 % | RESPIRATION RATE: 20 BRPM | BODY MASS INDEX: 33.59 KG/M2 | SYSTOLIC BLOOD PRESSURE: 170 MMHG | HEIGHT: 72 IN | HEART RATE: 70 BPM | TEMPERATURE: 98.4 F | WEIGHT: 248 LBS

## 2021-01-20 DIAGNOSIS — Z79.4 TYPE 2 DIABETES MELLITUS WITHOUT COMPLICATION, WITH LONG-TERM CURRENT USE OF INSULIN (HCC): ICD-10-CM

## 2021-01-20 DIAGNOSIS — N18.32 STAGE 3B CHRONIC KIDNEY DISEASE (HCC): ICD-10-CM

## 2021-01-20 DIAGNOSIS — I10 ESSENTIAL HYPERTENSION: ICD-10-CM

## 2021-01-20 DIAGNOSIS — R79.89 ELEVATED BRAIN NATRIURETIC PEPTIDE (BNP) LEVEL: ICD-10-CM

## 2021-01-20 DIAGNOSIS — R09.81 SINUS CONGESTION: Primary | ICD-10-CM

## 2021-01-20 DIAGNOSIS — E11.9 TYPE 2 DIABETES MELLITUS WITHOUT COMPLICATION, WITH LONG-TERM CURRENT USE OF INSULIN (HCC): ICD-10-CM

## 2021-01-20 DIAGNOSIS — M79.89 LEG SWELLING: ICD-10-CM

## 2021-01-20 LAB
ALBUMIN SERPL-MCNC: 3.7 G/DL (ref 3.5–5.2)
ALP BLD-CCNC: 61 U/L (ref 40–129)
ALT SERPL-CCNC: 11 U/L (ref 0–40)
ANION GAP SERPL CALCULATED.3IONS-SCNC: 17 MMOL/L (ref 7–16)
AST SERPL-CCNC: 16 U/L (ref 0–39)
BASOPHILS ABSOLUTE: 0.03 E9/L (ref 0–0.2)
BASOPHILS RELATIVE PERCENT: 0.5 % (ref 0–2)
BILIRUB SERPL-MCNC: 0.3 MG/DL (ref 0–1.2)
BUN BLDV-MCNC: 31 MG/DL (ref 8–23)
CALCIUM SERPL-MCNC: 9.2 MG/DL (ref 8.6–10.2)
CHLORIDE BLD-SCNC: 103 MMOL/L (ref 98–107)
CO2: 22 MMOL/L (ref 22–29)
CREAT SERPL-MCNC: 2.2 MG/DL (ref 0.7–1.2)
EOSINOPHILS ABSOLUTE: 0.25 E9/L (ref 0.05–0.5)
EOSINOPHILS RELATIVE PERCENT: 4.1 % (ref 0–6)
GFR AFRICAN AMERICAN: 35
GFR NON-AFRICAN AMERICAN: 29 ML/MIN/1.73
GLUCOSE BLD-MCNC: 189 MG/DL (ref 74–99)
HBA1C MFR BLD: 7.3 % (ref 4–5.6)
HCT VFR BLD CALC: 40.4 % (ref 37–54)
HEMOGLOBIN: 12.9 G/DL (ref 12.5–16.5)
IMMATURE GRANULOCYTES #: 0.02 E9/L
IMMATURE GRANULOCYTES %: 0.3 % (ref 0–5)
LYMPHOCYTES ABSOLUTE: 1.31 E9/L (ref 1.5–4)
LYMPHOCYTES RELATIVE PERCENT: 21.5 % (ref 20–42)
MCH RBC QN AUTO: 28.2 PG (ref 26–35)
MCHC RBC AUTO-ENTMCNC: 31.9 % (ref 32–34.5)
MCV RBC AUTO: 88.2 FL (ref 80–99.9)
MONOCYTES ABSOLUTE: 0.69 E9/L (ref 0.1–0.95)
MONOCYTES RELATIVE PERCENT: 11.3 % (ref 2–12)
NEUTROPHILS ABSOLUTE: 3.78 E9/L (ref 1.8–7.3)
NEUTROPHILS RELATIVE PERCENT: 62.3 % (ref 43–80)
PDW BLD-RTO: 14.6 FL (ref 11.5–15)
PLATELET # BLD: 228 E9/L (ref 130–450)
PMV BLD AUTO: 11.7 FL (ref 7–12)
POTASSIUM SERPL-SCNC: 4.5 MMOL/L (ref 3.5–5)
RBC # BLD: 4.58 E12/L (ref 3.8–5.8)
SODIUM BLD-SCNC: 142 MMOL/L (ref 132–146)
TOTAL PROTEIN: 6.7 G/DL (ref 6.4–8.3)
WBC # BLD: 6.1 E9/L (ref 4.5–11.5)

## 2021-01-20 PROCEDURE — 36415 COLL VENOUS BLD VENIPUNCTURE: CPT | Performed by: FAMILY MEDICINE

## 2021-01-20 PROCEDURE — 4004F PT TOBACCO SCREEN RCVD TLK: CPT | Performed by: FAMILY MEDICINE

## 2021-01-20 PROCEDURE — 4040F PNEUMOC VAC/ADMIN/RCVD: CPT | Performed by: FAMILY MEDICINE

## 2021-01-20 PROCEDURE — G8417 CALC BMI ABV UP PARAM F/U: HCPCS | Performed by: FAMILY MEDICINE

## 2021-01-20 PROCEDURE — 99214 OFFICE O/P EST MOD 30 MIN: CPT | Performed by: FAMILY MEDICINE

## 2021-01-20 PROCEDURE — G8427 DOCREV CUR MEDS BY ELIG CLIN: HCPCS | Performed by: FAMILY MEDICINE

## 2021-01-20 PROCEDURE — G8484 FLU IMMUNIZE NO ADMIN: HCPCS | Performed by: FAMILY MEDICINE

## 2021-01-20 PROCEDURE — 1123F ACP DISCUSS/DSCN MKR DOCD: CPT | Performed by: FAMILY MEDICINE

## 2021-01-20 RX ORDER — PRAMIPEXOLE DIHYDROCHLORIDE 0.12 MG/1
0.12 TABLET ORAL NIGHTLY
Qty: 30 TABLET | Refills: 5 | Status: SHIPPED
Start: 2021-01-20 | End: 2021-05-05 | Stop reason: SDUPTHER

## 2021-01-20 RX ORDER — TRAZODONE HYDROCHLORIDE 150 MG/1
150 TABLET ORAL NIGHTLY
Qty: 30 TABLET | Refills: 5 | Status: SHIPPED
Start: 2021-01-20 | End: 2021-08-25

## 2021-01-20 RX ORDER — GABAPENTIN 300 MG/1
300 CAPSULE ORAL NIGHTLY
Qty: 30 CAPSULE | Refills: 5 | Status: SHIPPED
Start: 2021-01-20 | End: 2021-08-13 | Stop reason: SDUPTHER

## 2021-01-20 RX ORDER — POTASSIUM CHLORIDE 20 MEQ/1
20 TABLET, EXTENDED RELEASE ORAL 2 TIMES DAILY
Qty: 60 TABLET | Refills: 5 | Status: SHIPPED
Start: 2021-01-20 | End: 2021-12-23 | Stop reason: ALTCHOICE

## 2021-01-20 RX ORDER — AMOXICILLIN AND CLAVULANATE POTASSIUM 500; 125 MG/1; MG/1
1 TABLET, FILM COATED ORAL 2 TIMES DAILY
Qty: 20 TABLET | Refills: 0 | Status: SHIPPED | OUTPATIENT
Start: 2021-01-20 | End: 2021-01-30

## 2021-01-20 RX ORDER — HYDRALAZINE HYDROCHLORIDE 25 MG/1
25 TABLET, FILM COATED ORAL 3 TIMES DAILY
Qty: 90 TABLET | Refills: 5 | Status: SHIPPED
Start: 2021-01-20 | End: 2021-10-06

## 2021-01-20 RX ORDER — INSULIN GLARGINE 100 [IU]/ML
38 INJECTION, SOLUTION SUBCUTANEOUS NIGHTLY
Qty: 5 PEN | Refills: 5 | Status: SHIPPED
Start: 2021-01-20 | End: 2021-12-23 | Stop reason: ALTCHOICE

## 2021-01-20 RX ORDER — LOSARTAN POTASSIUM 100 MG/1
100 TABLET ORAL DAILY
Qty: 30 TABLET | Refills: 5 | Status: SHIPPED
Start: 2021-01-20 | End: 2021-08-25

## 2021-01-20 RX ORDER — INSULIN LISPRO 100 [IU]/ML
1 INJECTION, SOLUTION INTRAVENOUS; SUBCUTANEOUS
Qty: 5 PEN | Refills: 5 | Status: SHIPPED
Start: 2021-01-20 | End: 2021-01-21 | Stop reason: SDUPTHER

## 2021-01-20 RX ORDER — ATORVASTATIN CALCIUM 20 MG/1
20 TABLET, FILM COATED ORAL DAILY
Qty: 30 TABLET | Refills: 5 | Status: SHIPPED
Start: 2021-01-20 | End: 2021-09-09

## 2021-01-20 RX ORDER — FUROSEMIDE 20 MG/1
20 TABLET ORAL DAILY
Qty: 30 TABLET | Refills: 5 | Status: SHIPPED
Start: 2021-01-20 | End: 2021-12-08

## 2021-01-20 RX ORDER — AMLODIPINE BESYLATE 10 MG/1
10 TABLET ORAL DAILY
Qty: 30 TABLET | Refills: 5 | Status: SHIPPED
Start: 2021-01-20 | End: 2021-12-23 | Stop reason: ALTCHOICE

## 2021-01-20 ASSESSMENT — PATIENT HEALTH QUESTIONNAIRE - PHQ9
SUM OF ALL RESPONSES TO PHQ QUESTIONS 1-9: 0
1. LITTLE INTEREST OR PLEASURE IN DOING THINGS: 0

## 2021-01-20 NOTE — PROGRESS NOTES
[facility]    Perry Waite presents to the office today for   Chief Complaint   Patient presents with    3 Month Follow-Up     For chronic HTN and diabetes.  Medication Refill    Leg Pain     States that he is having calf pain in bothe legs X 3 months, states that it is worse at night and when he is on his feet. Blood pressure elevated  He has taken medication today  Nephrology appt next month Feb 4 in [facility] of sinus congestion  No fever  Mild cough at night  No loss of taste or smell  No diarrhea    He does not know which insulin he is taking or dosage  He will call when he gets home  Reports sugars have been good    Review of Systems     BP (!) 170/75   Pulse 70   Temp 98.4 °F (36.9 °C)   Resp 20   Ht 6' (1.829 m)   Wt 248 lb (112.5 kg)   SpO2 98%   BMI 33.63 kg/m²   Physical Exam  Constitutional:       Appearance: Normal appearance. HENT:      Head: Normocephalic and atraumatic. Eyes:      Extraocular Movements: Extraocular movements intact. Conjunctiva/sclera: Conjunctivae normal.   Cardiovascular:      Rate and Rhythm: Normal rate. Heart sounds: Normal heart sounds. Pulmonary:      Effort: Pulmonary effort is normal.      Breath sounds: Normal breath sounds. Skin:     General: Skin is warm. Neurological:      Mental Status: He is alert and oriented to person, place, and time. Psychiatric:         Mood and Affect: Mood normal.         Behavior: Behavior normal.            Current Outpatient Medications:     amoxicillin-clavulanate (AUGMENTIN) 500-125 MG per tablet, Take 1 tablet by mouth 2 times daily for 10 days, Disp: 20 tablet, Rfl: 0    insulin glargine (LANTUS SOLOSTAR) 100 UNIT/ML injection pen, Inject 38 Units into the skin nightly, Disp: 5 pen, Rfl: 3    gabapentin (NEURONTIN) 300 MG capsule, Take 1 capsule by mouth nightly for 30 days. , Disp: 30 capsule, Rfl: 0   atorvastatin (LIPITOR) 20 MG tablet, Take 1 tablet by mouth daily, Disp: 30 tablet, Rfl: 0    hydrALAZINE (APRESOLINE) 25 MG tablet, Take 1 tablet by mouth 3 times daily, Disp: 90 tablet, Rfl: 0    gabapentin (NEURONTIN) 600 MG tablet, take 1 tablet by mouth NIGHTLY, Disp: , Rfl:     insulin lispro, 1 Unit Dial, 100 UNIT/ML SOPN, insulin lispro (U-100) 100 unit/mL subcutaneous pen, Disp: , Rfl:     traZODone (DESYREL) 150 MG tablet, trazodone 150 mg tablet, Disp: , Rfl:     furosemide (LASIX) 20 MG tablet, Take 1 tablet by mouth daily, Disp: 30 tablet, Rfl: 5    Handicap Placard MISC, by Does not apply route X 5 years, Disp: 1 each, Rfl: 0    Handicap Placard MISC, by Does not apply route, Disp: 1 each, Rfl: 0    amLODIPine (NORVASC) 10 MG tablet, Take 1 tablet by mouth daily, Disp: 90 tablet, Rfl: 1    triamcinolone (KENALOG) 0.1 % cream, Apply topically 2 times daily. , Disp: 15 g, Rfl: 1    pramipexole (MIRAPEX) 0.125 MG tablet, TAKE 2 TABLETS BY MOUTH 2 TO 3 HOURS BEFORE BEDTIME FOR RESTLESS LEGS, Disp: , Rfl:     insulin NPH (HUMULIN N;NOVOLIN N) 100 UNIT/ML injection vial, Inject into the skin 2 times daily (before meals) 20 units in am and 15 units at night, Disp: , Rfl:     fluticasone-salmeterol (ADVAIR) 500-50 MCG/DOSE diskus inhaler, Inhale 1 puff into the lungs every 12 hours To use dos and bring, Disp: , Rfl:     albuterol (PROVENTIL) (2.5 MG/3ML) 0.083% nebulizer solution, Take 2.5 mg by nebulization every 6 hours as needed for Wheezing To use dos if needs, Disp: , Rfl:     glipiZIDE (GLUCOTROL) 5 MG tablet, Take 5 mg by mouth 2 times daily (before meals), Disp: , Rfl:     potassium chloride (KLOR-CON M) 20 MEQ extended release tablet, Take 20 mEq by mouth 2 times daily, Disp: , Rfl:     losartan (COZAAR) 100 MG tablet, Take 100 mg by mouth daily, Disp: , Rfl:   blood glucose test strips (ASCENSIA AUTODISC VI;ONE TOUCH ULTRA TEST VI) strip, 1 each by In Vitro route 4 times daily As needed. , Disp: 300 strip, Rfl: 1     Past Medical History:   Diagnosis Date    Bronchitis     recent, abx completed, has minimal cough at this time    Carpal tunnel syndrome, right     COPD (chronic obstructive pulmonary disease) (HCC)     Diabetes ([facility] [address]     Foot drop, left     dt nerve damage    Hyperlipidemia     Hypertension     Neuropathy     left leg       Sergey Butter was seen today for 3 month follow-up, medication refill and leg pain. Diagnoses and all orders for this visit:    Sinus congestion  -     amoxicillin-clavulanate (AUGMENTIN) 500-125 MG per tablet; Take 1 tablet by mouth 2 times daily for 10 days    Essential hypertension    Stage 3b chronic kidney disease  -     CBC Auto Differential; Future  -     Comprehensive Metabolic Panel; Future  -     Hemoglobin A1C; Future    Type 2 diabetes mellitus without complication, with long-term current use of insulin (HCC)  -     CBC Auto Differential; Future  -     Comprehensive Metabolic Panel;  Future  -     Hemoglobin A1C; Future     labs today  He doesn't know medications  Needs to call back to make sure he is taking all blood pressure meds he is supposed to be  Below written instructions given to him in bold  Recheck BP in 1 month  Get COVID vaccine  Call back with all of your medications you are taking and what you need refilled  Get your COVID vaccine - see list of places where you can go  See Dr. Odalys Urias in 1 month to recheck your blood pressure  Bring all of your bottles to your appointment  Alycia Gonzales MD

## 2021-01-20 NOTE — PATIENT INSTRUCTIONS
Call back with all of your medications you are taking and what you need refilled  Get your COVID vaccine - see list of places where you can go  See Dr. Ngoc Mercer in 1 month to recheck your blood pressure  Bring all of your bottles to your appointment

## 2021-01-20 NOTE — TELEPHONE ENCOUNTER
Called for clarification on rx Dr. Noelle Marino sent in today on insulin-lispro penny kamara [phone]

## 2021-01-21 RX ORDER — INSULIN LISPRO 100 [IU]/ML
INJECTION, SOLUTION INTRAVENOUS; SUBCUTANEOUS
Qty: 5 PEN | Refills: 5 | Status: SHIPPED | OUTPATIENT
Start: 2021-01-21 | End: 2021-01-21 | Stop reason: SDUPTHER

## 2021-01-21 NOTE — TELEPHONE ENCOUNTER
Pt states take this on a sliding scale  Tid. 180-200 - 2 u                                                                          201-230 - 4u        231-280 - 6u                                                                          281-300-8u                   301-350 - 10 u                                                                          351-400- 12 u                                                                          >400 call .   I will try to put in rx correctly and route to you.

## 2021-01-22 ENCOUNTER — TELEPHONE (OUTPATIENT)
Dept: PRIMARY CARE CLINIC | Age: 81
End: 2021-01-22

## 2021-01-22 RX ORDER — INSULIN LISPRO 100 [IU]/ML
INJECTION, SOLUTION INTRAVENOUS; SUBCUTANEOUS
Qty: 5 PEN | Refills: 5 | Status: SHIPPED
Start: 2021-01-22 | End: 2021-12-23

## 2021-02-17 ENCOUNTER — OFFICE VISIT (OUTPATIENT)
Dept: PRIMARY CARE CLINIC | Age: 81
End: 2021-02-17
Payer: MEDICARE

## 2021-02-17 VITALS
OXYGEN SATURATION: 95 % | SYSTOLIC BLOOD PRESSURE: 128 MMHG | HEIGHT: 72 IN | BODY MASS INDEX: 35.24 KG/M2 | WEIGHT: 260.14 LBS | HEART RATE: 74 BPM | RESPIRATION RATE: 16 BRPM | DIASTOLIC BLOOD PRESSURE: 62 MMHG | TEMPERATURE: 97.7 F

## 2021-02-17 DIAGNOSIS — G89.29 CHRONIC PAIN OF RIGHT KNEE: ICD-10-CM

## 2021-02-17 DIAGNOSIS — Z79.4 TYPE 2 DIABETES MELLITUS WITHOUT COMPLICATION, WITH LONG-TERM CURRENT USE OF INSULIN (HCC): ICD-10-CM

## 2021-02-17 DIAGNOSIS — E11.9 TYPE 2 DIABETES MELLITUS WITHOUT COMPLICATION, WITH LONG-TERM CURRENT USE OF INSULIN (HCC): ICD-10-CM

## 2021-02-17 DIAGNOSIS — G89.4 CHRONIC PAIN SYNDROME: Primary | ICD-10-CM

## 2021-02-17 DIAGNOSIS — M25.561 CHRONIC PAIN OF RIGHT KNEE: ICD-10-CM

## 2021-02-17 DIAGNOSIS — N18.32 STAGE 3B CHRONIC KIDNEY DISEASE (HCC): ICD-10-CM

## 2021-02-17 DIAGNOSIS — M25.551 RIGHT HIP PAIN: ICD-10-CM

## 2021-02-17 PROCEDURE — G8417 CALC BMI ABV UP PARAM F/U: HCPCS | Performed by: FAMILY MEDICINE

## 2021-02-17 PROCEDURE — G8427 DOCREV CUR MEDS BY ELIG CLIN: HCPCS | Performed by: FAMILY MEDICINE

## 2021-02-17 PROCEDURE — 1123F ACP DISCUSS/DSCN MKR DOCD: CPT | Performed by: FAMILY MEDICINE

## 2021-02-17 PROCEDURE — 4004F PT TOBACCO SCREEN RCVD TLK: CPT | Performed by: FAMILY MEDICINE

## 2021-02-17 PROCEDURE — 3051F HG A1C>EQUAL 7.0%<8.0%: CPT | Performed by: FAMILY MEDICINE

## 2021-02-17 PROCEDURE — 4040F PNEUMOC VAC/ADMIN/RCVD: CPT | Performed by: FAMILY MEDICINE

## 2021-02-17 PROCEDURE — G8484 FLU IMMUNIZE NO ADMIN: HCPCS | Performed by: FAMILY MEDICINE

## 2021-02-17 PROCEDURE — 99214 OFFICE O/P EST MOD 30 MIN: CPT | Performed by: FAMILY MEDICINE

## 2021-02-17 RX ORDER — TRAMADOL HYDROCHLORIDE 50 MG/1
50 TABLET ORAL EVERY 12 HOURS PRN
Qty: 60 TABLET | Refills: 2 | Status: SHIPPED
Start: 2021-02-17 | End: 2021-03-17

## 2021-02-17 NOTE — LETTER
[address] [place] [address]  Phone: [phone]  Fax: [phone]    Dianne Gamez MD        February 17, 2021     Patient: Milagros Cunningham   YOB: 1940   Date of Visit: 2/17/2021       To Whom It May Concern: It is my medical opinion that Milagros Cunningham is unable to live in a home or apartment with stairs due to medical conditions. If you have any questions or concerns, please don't hesitate to call.     Sincerely,        Dianne Gamez MD

## 2021-02-17 NOTE — PROGRESS NOTES
[facility]    Guido Ordonez presents to the office today for   Chief Complaint   Patient presents with    1 Month Follow-Up     CKD III  HE saw nephrology  No med changes  Blood pressure excellent    Blood pressure normalized  Taking all meds as Rx    Needs letter to get out of lease  Stairs are becoming too difficult with his osteoarthritis    Chronic pain  Really difficult time sleeping  Tylenol not working  Cannot take NSAIDs due to CKD    Review of Systems     /62   Pulse 74   Temp 97.7 °F (36.5 °C)   Resp 16   Ht 6' (1.829 m)   Wt 260 lb 2.3 oz (118 kg)   SpO2 95%   BMI 35.28 kg/m²   Physical Exam  Constitutional:       Appearance: Normal appearance. HENT:      Head: Normocephalic and atraumatic. Eyes:      Extraocular Movements: Extraocular movements intact. Conjunctiva/sclera: Conjunctivae normal.   Cardiovascular:      Rate and Rhythm: Normal rate. Pulmonary:      Effort: Pulmonary effort is normal.   Skin:     General: Skin is warm. Neurological:      Mental Status: He is alert and oriented to person, place, and time. Psychiatric:         Mood and Affect: Mood normal.         Behavior: Behavior normal.            Current Outpatient Medications:     traMADol (ULTRAM) 50 MG tablet, Take 1 tablet by mouth every 12 hours as needed for Pain for up to 30 days. , Disp: 60 tablet, Rfl: 2    insulin lispro, 1 Unit Dial, 100 UNIT/ML SOPN, Pt uses this on sliding scale  Tid.  Breakfast, lunch and dinner Above 180-200 = 2 u 201-230=4 u 231-280=6u 281-300=8u 301-350=10u 350-400=12u >400 call doctor, Disp: 5 pen, Rfl: 5    amLODIPine (NORVASC) 10 MG tablet, Take 1 tablet by mouth daily, Disp: 30 tablet, Rfl: 5    atorvastatin (LIPITOR) 20 MG tablet, Take 1 tablet by mouth daily, Disp: 30 tablet, Rfl: 5    furosemide (LASIX) 20 MG tablet, Take 1 tablet by mouth daily, Disp: 30 tablet, Rfl: 5 Mabel Leyva was seen today for 1 month follow-up. Diagnoses and all orders for this visit:    Chronic pain syndrome  -     traMADol (ULTRAM) 50 MG tablet; Take 1 tablet by mouth every 12 hours as needed for Pain for up to 30 days.     Stage 3b chronic kidney disease    Type 2 diabetes mellitus without complication, with long-term current use of insulin (HCC)    Right hip pain    Chronic pain of right knee       Stable  Recheck 1 month  Briana Reddy MD

## 2021-03-17 ENCOUNTER — OFFICE VISIT (OUTPATIENT)
Dept: PRIMARY CARE CLINIC | Age: 81
End: 2021-03-17
Payer: MEDICARE

## 2021-03-17 VITALS
RESPIRATION RATE: 18 BRPM | WEIGHT: 245 LBS | DIASTOLIC BLOOD PRESSURE: 60 MMHG | BODY MASS INDEX: 33.18 KG/M2 | TEMPERATURE: 98.4 F | SYSTOLIC BLOOD PRESSURE: 140 MMHG | OXYGEN SATURATION: 96 % | HEIGHT: 72 IN | HEART RATE: 81 BPM

## 2021-03-17 DIAGNOSIS — G89.4 CHRONIC PAIN SYNDROME: ICD-10-CM

## 2021-03-17 DIAGNOSIS — Z79.4 TYPE 2 DIABETES MELLITUS WITHOUT COMPLICATION, WITH LONG-TERM CURRENT USE OF INSULIN (HCC): ICD-10-CM

## 2021-03-17 DIAGNOSIS — R53.83 FATIGUE, UNSPECIFIED TYPE: Primary | ICD-10-CM

## 2021-03-17 DIAGNOSIS — I10 ESSENTIAL HYPERTENSION: ICD-10-CM

## 2021-03-17 DIAGNOSIS — N18.32 STAGE 3B CHRONIC KIDNEY DISEASE (HCC): ICD-10-CM

## 2021-03-17 DIAGNOSIS — Z86.73 HISTORY OF CVA (CEREBROVASCULAR ACCIDENT): ICD-10-CM

## 2021-03-17 DIAGNOSIS — E11.9 TYPE 2 DIABETES MELLITUS WITHOUT COMPLICATION, WITH LONG-TERM CURRENT USE OF INSULIN (HCC): ICD-10-CM

## 2021-03-17 PROCEDURE — G8417 CALC BMI ABV UP PARAM F/U: HCPCS | Performed by: FAMILY MEDICINE

## 2021-03-17 PROCEDURE — 4040F PNEUMOC VAC/ADMIN/RCVD: CPT | Performed by: FAMILY MEDICINE

## 2021-03-17 PROCEDURE — 4004F PT TOBACCO SCREEN RCVD TLK: CPT | Performed by: FAMILY MEDICINE

## 2021-03-17 PROCEDURE — 3051F HG A1C>EQUAL 7.0%<8.0%: CPT | Performed by: FAMILY MEDICINE

## 2021-03-17 PROCEDURE — 1123F ACP DISCUSS/DSCN MKR DOCD: CPT | Performed by: FAMILY MEDICINE

## 2021-03-17 PROCEDURE — G8484 FLU IMMUNIZE NO ADMIN: HCPCS | Performed by: FAMILY MEDICINE

## 2021-03-17 PROCEDURE — G8427 DOCREV CUR MEDS BY ELIG CLIN: HCPCS | Performed by: FAMILY MEDICINE

## 2021-03-17 PROCEDURE — 99214 OFFICE O/P EST MOD 30 MIN: CPT | Performed by: FAMILY MEDICINE

## 2021-03-17 RX ORDER — TRAMADOL HYDROCHLORIDE 50 MG/1
50 TABLET ORAL EVERY 12 HOURS PRN
Qty: 60 TABLET | Refills: 2 | Status: SHIPPED | OUTPATIENT
Start: 2021-03-17 | End: 2021-04-16

## 2021-03-17 RX ORDER — CYANOCOBALAMIN 1000 UG/ML
1000 INJECTION INTRAMUSCULAR; SUBCUTANEOUS
Qty: 1 ML | Refills: 5 | Status: SHIPPED
Start: 2021-03-17 | End: 2021-09-08

## 2021-03-17 ASSESSMENT — PATIENT HEALTH QUESTIONNAIRE - PHQ9
SUM OF ALL RESPONSES TO PHQ QUESTIONS 1-9: 0

## 2021-03-17 ASSESSMENT — ENCOUNTER SYMPTOMS
NAUSEA: 0
VOMITING: 0

## 2021-03-17 NOTE — PROGRESS NOTES
[facility]    Sunil Franco presents to the office today for   Chief Complaint   Patient presents with   Cynthia Fisher    Other     States that his legs have been geting more weak and they hurt. States that at his last visit he was suppose to get pain medication but it was never called in.  Fatigue     States that he feels really lazy all the time X 1 month     Chronic pain syndrome  He did not get his Tramadol filled  Pharmacy said they did not have it  Reviewed Rx and OAR[facility] and he did not pick it up    Moving his apartment to first floor this week    Taking water pills regularly  Blood pressure reasonable    Review of Systems   Constitutional: Negative for chills and fever. Gastrointestinal: Negative for nausea and vomiting. Genitourinary: Negative for dysuria, hematuria and urgency. Skin: Negative for rash. BP (!) 140/60   Pulse 81   Temp 98.4 °F (36.9 °C)   Resp 18   Ht 6' (1.829 m)   Wt 245 lb (111.1 kg)   SpO2 96%   BMI 33.23 kg/m²   Physical Exam  Constitutional:       Appearance: Normal appearance. HENT:      Head: Normocephalic and atraumatic. Eyes:      Extraocular Movements: Extraocular movements intact. Conjunctiva/sclera: Conjunctivae normal.   Cardiovascular:      Rate and Rhythm: Normal rate. Heart sounds: Normal heart sounds. Pulmonary:      Effort: Pulmonary effort is normal.      Breath sounds: Normal breath sounds. Skin:     General: Skin is warm. Neurological:      Mental Status: He is alert and oriented to person, place, and time. Psychiatric:         Mood and Affect: Mood normal.         Behavior: Behavior normal.            Current Outpatient Medications:     traMADol (ULTRAM) 50 MG tablet, Take 1 tablet by mouth every 12 hours as needed for Pain for up to 30 days. Intended supply: 5 days.  Take lowest dose possible to manage pain, Disp: 60 tablet, Rfl: 2    cyanocobalamin 1000 MCG/ML injection, Inject 1 mL into the muscle every 30 days, Disp: 1 mL, Rfl: 5    insulin lispro, 1 Unit Dial, 100 UNIT/ML SOPN, Pt uses this on sliding scale  Tid. Breakfast, lunch and dinner Above 180-200 = 2 u 201-230=4 u 231-280=6u 281-300=8u 301-350=10u 350-400=12u >400 call doctor, Disp: 5 pen, Rfl: 5    Handicap Placard MISC, by Does not apply route X 5 years, Disp: 1 each, Rfl: 0    Handicap Placard MISC, by Does not apply route, Disp: 1 each, Rfl: 0    albuterol (PROVENTIL) (2.5 MG/3ML) 0.083% nebulizer solution, Take 2.5 mg by nebulization every 6 hours as needed for Wheezing To use dos if needs, Disp: , Rfl:     amLODIPine (NORVASC) 10 MG tablet, Take 1 tablet by mouth daily, Disp: 30 tablet, Rfl: 5    atorvastatin (LIPITOR) 20 MG tablet, Take 1 tablet by mouth daily, Disp: 30 tablet, Rfl: 5    blood glucose test strips (ASCENSIA AUTODISC VI;ONE TOUCH ULTRA TEST VI) strip, 1 each by In Vitro route 4 times daily As needed. (Patient not taking: Reported on 2/17/2021), Disp: 120 strip, Rfl: 5    furosemide (LASIX) 20 MG tablet, Take 1 tablet by mouth daily, Disp: 30 tablet, Rfl: 5    gabapentin (NEURONTIN) 300 MG capsule, Take 1 capsule by mouth nightly for 30 days. , Disp: 30 capsule, Rfl: 5    hydrALAZINE (APRESOLINE) 25 MG tablet, Take 1 tablet by mouth 3 times daily, Disp: 90 tablet, Rfl: 5    insulin glargine (LANTUS SOLOSTAR) 100 UNIT/ML injection pen, Inject 38 Units into the skin nightly, Disp: 5 pen, Rfl: 5    fluticasone-salmeterol (ADVAIR) 500-50 MCG/DOSE diskus inhaler, Inhale 1 puff into the lungs every 12 hours To use dos and bring, Disp: 60 each, Rfl: 5    losartan (COZAAR) 100 MG tablet, Take 1 tablet by mouth daily, Disp: 30 tablet, Rfl: 5    potassium chloride (KLOR-CON M) 20 MEQ extended release tablet, Take 1 tablet by mouth 2 times daily, Disp: 60 tablet, Rfl: 5    pramipexole (MIRAPEX) 0.125 MG tablet, Take 1 tablet by mouth nightly, Disp: 30 tablet, Rfl: 5    traZODone (DESYREL) 150 MG tablet, Take 1 tablet by mouth nightly

## 2021-03-24 ENCOUNTER — NURSE ONLY (OUTPATIENT)
Dept: PRIMARY CARE CLINIC | Age: 81
End: 2021-03-24
Payer: MEDICARE

## 2021-03-24 DIAGNOSIS — R53.83 OTHER FATIGUE: Primary | ICD-10-CM

## 2021-03-24 PROCEDURE — 96372 THER/PROPH/DIAG INJ SC/IM: CPT | Performed by: FAMILY MEDICINE

## 2021-03-24 RX ORDER — CYANOCOBALAMIN 1000 UG/ML
1000 INJECTION INTRAMUSCULAR; SUBCUTANEOUS ONCE
Status: COMPLETED | OUTPATIENT
Start: 2021-03-24 | End: 2021-03-24

## 2021-03-24 RX ADMIN — CYANOCOBALAMIN 1000 MCG: 1000 INJECTION INTRAMUSCULAR; SUBCUTANEOUS at 11:28

## 2021-04-21 ENCOUNTER — NURSE ONLY (OUTPATIENT)
Dept: PRIMARY CARE CLINIC | Age: 81
End: 2021-04-21
Payer: MEDICARE

## 2021-04-21 DIAGNOSIS — E53.8 VITAMIN B12 DEFICIENCY: Primary | ICD-10-CM

## 2021-04-21 PROCEDURE — 96372 THER/PROPH/DIAG INJ SC/IM: CPT | Performed by: FAMILY MEDICINE

## 2021-04-21 RX ORDER — CYANOCOBALAMIN 1000 UG/ML
1000 INJECTION INTRAMUSCULAR; SUBCUTANEOUS ONCE
Status: COMPLETED | OUTPATIENT
Start: 2021-04-21 | End: 2021-04-21

## 2021-04-21 RX ADMIN — CYANOCOBALAMIN 1000 MCG: 1000 INJECTION INTRAMUSCULAR; SUBCUTANEOUS at 12:02

## 2021-04-30 ENCOUNTER — TELEPHONE (OUTPATIENT)
Dept: PRIMARY CARE CLINIC | Age: 81
End: 2021-04-30

## 2021-04-30 DIAGNOSIS — Z79.4 TYPE 2 DIABETES MELLITUS WITHOUT COMPLICATION, WITH LONG-TERM CURRENT USE OF INSULIN (HCC): Primary | ICD-10-CM

## 2021-04-30 DIAGNOSIS — N18.32 STAGE 3B CHRONIC KIDNEY DISEASE (HCC): ICD-10-CM

## 2021-04-30 DIAGNOSIS — E11.9 TYPE 2 DIABETES MELLITUS WITHOUT COMPLICATION, WITH LONG-TERM CURRENT USE OF INSULIN (HCC): Primary | ICD-10-CM

## 2021-04-30 DIAGNOSIS — E53.8 VITAMIN B12 DEFICIENCY: ICD-10-CM

## 2021-04-30 NOTE — TELEPHONE ENCOUNTER
Pt is on lab schedule  Next Wed. at 11:00. No orders in.   Can you put orders in or is this just for a nurse visit poct inr?

## 2021-05-05 RX ORDER — PRAMIPEXOLE DIHYDROCHLORIDE 0.12 MG/1
0.12 TABLET ORAL NIGHTLY
Qty: 30 TABLET | Refills: 5 | Status: SHIPPED
Start: 2021-05-05 | End: 2021-12-23 | Stop reason: ALTCHOICE

## 2021-06-03 RX ORDER — ALBUTEROL SULFATE 2.5 MG/3ML
2.5 SOLUTION RESPIRATORY (INHALATION) EVERY 6 HOURS PRN
Qty: 120 EACH | Refills: 5 | Status: SHIPPED
Start: 2021-06-03 | End: 2021-12-15 | Stop reason: SDUPTHER

## 2021-07-01 ENCOUNTER — TELEPHONE (OUTPATIENT)
Dept: ADMINISTRATIVE | Age: 81
End: 2021-07-01

## 2021-07-01 NOTE — TELEPHONE ENCOUNTER
Pt would  Like to change doc's from Dr Rodrigue Rasmussen to Dr Monica Leal, please advise if ok.  [phone]

## 2021-07-13 ENCOUNTER — OFFICE VISIT (OUTPATIENT)
Dept: PRIMARY CARE CLINIC | Age: 81
End: 2021-07-13
Payer: MEDICARE

## 2021-07-13 VITALS
BODY MASS INDEX: 34.27 KG/M2 | WEIGHT: 253 LBS | DIASTOLIC BLOOD PRESSURE: 70 MMHG | TEMPERATURE: 97.7 F | HEART RATE: 68 BPM | HEIGHT: 72 IN | SYSTOLIC BLOOD PRESSURE: 176 MMHG | OXYGEN SATURATION: 97 %

## 2021-07-13 DIAGNOSIS — G89.29 CHRONIC BILATERAL LOW BACK PAIN WITH LEFT-SIDED SCIATICA: ICD-10-CM

## 2021-07-13 DIAGNOSIS — R09.81 SINUS CONGESTION: Primary | ICD-10-CM

## 2021-07-13 DIAGNOSIS — M25.552 LEFT HIP PAIN: ICD-10-CM

## 2021-07-13 DIAGNOSIS — J43.9 PULMONARY EMPHYSEMA, UNSPECIFIED EMPHYSEMA TYPE (HCC): ICD-10-CM

## 2021-07-13 DIAGNOSIS — M54.42 CHRONIC BILATERAL LOW BACK PAIN WITH LEFT-SIDED SCIATICA: ICD-10-CM

## 2021-07-13 DIAGNOSIS — I50.33 ACUTE ON CHRONIC DIASTOLIC HEART FAILURE (HCC): ICD-10-CM

## 2021-07-13 PROCEDURE — G8427 DOCREV CUR MEDS BY ELIG CLIN: HCPCS | Performed by: FAMILY MEDICINE

## 2021-07-13 PROCEDURE — 4004F PT TOBACCO SCREEN RCVD TLK: CPT | Performed by: FAMILY MEDICINE

## 2021-07-13 PROCEDURE — 4040F PNEUMOC VAC/ADMIN/RCVD: CPT | Performed by: FAMILY MEDICINE

## 2021-07-13 PROCEDURE — 3023F SPIROM DOC REV: CPT | Performed by: FAMILY MEDICINE

## 2021-07-13 PROCEDURE — G8417 CALC BMI ABV UP PARAM F/U: HCPCS | Performed by: FAMILY MEDICINE

## 2021-07-13 PROCEDURE — G8926 SPIRO NO PERF OR DOC: HCPCS | Performed by: FAMILY MEDICINE

## 2021-07-13 PROCEDURE — 99214 OFFICE O/P EST MOD 30 MIN: CPT | Performed by: FAMILY MEDICINE

## 2021-07-13 PROCEDURE — 1123F ACP DISCUSS/DSCN MKR DOCD: CPT | Performed by: FAMILY MEDICINE

## 2021-07-13 RX ORDER — TRAMADOL HYDROCHLORIDE 50 MG/1
50 TABLET ORAL EVERY 8 HOURS PRN
Qty: 90 TABLET | Refills: 2 | Status: SHIPPED | OUTPATIENT
Start: 2021-07-13 | End: 2021-08-12

## 2021-07-13 RX ORDER — AMOXICILLIN AND CLAVULANATE POTASSIUM 875; 125 MG/1; MG/1
1 TABLET, FILM COATED ORAL 2 TIMES DAILY
Qty: 14 TABLET | Refills: 0 | Status: SHIPPED | OUTPATIENT
Start: 2021-07-13 | End: 2021-07-20

## 2021-07-13 NOTE — PROGRESS NOTES
[facility] presents to the office today for   Chief Complaint   Patient presents with    Leg Pain    Sinus Problem     Left leg and hip pain  No injury  Can hardly walk  No Tramadol lately  Taking Gabapentin at bedtime    Sinus infection  Recurrent issue  Nasal congestion  Breathing stable    Review of Systems     BP (!) 176/70   Pulse 68   Temp 97.7 °F (36.5 °C) (Temporal)   Ht 6' (1.829 m)   Wt 253 lb (114.8 kg)   SpO2 97%   BMI 34.31 kg/m²   Physical Exam  Constitutional:       Appearance: Normal appearance. HENT:      Head: Normocephalic and atraumatic. Eyes:      Extraocular Movements: Extraocular movements intact. Conjunctiva/sclera: Conjunctivae normal.   Cardiovascular:      Rate and Rhythm: Normal rate. Heart sounds: Normal heart sounds. Pulmonary:      Effort: Pulmonary effort is normal.      Breath sounds: Normal breath sounds. Skin:     General: Skin is warm. Neurological:      Mental Status: He is alert and oriented to person, place, and time. Psychiatric:         Mood and Affect: Mood normal.         Behavior: Behavior normal.            Current Outpatient Medications:     amoxicillin-clavulanate (AUGMENTIN) 875-125 MG per tablet, Take 1 tablet by mouth 2 times daily for 7 days, Disp: 14 tablet, Rfl: 0    traMADol (ULTRAM) 50 MG tablet, Take 1 tablet by mouth every 8 hours as needed for Pain for up to 30 days. , Disp: 90 tablet, Rfl: 2    albuterol (PROVENTIL) (2.5 MG/3ML) 0.083% nebulizer solution, Take 3 mLs by nebulization every 6 hours as needed for Wheezing To use dos if needs, Disp: 120 each, Rfl: 5    pramipexole (MIRAPEX) 0.125 MG tablet, Take 1 tablet by mouth nightly, Disp: 30 tablet, Rfl: 5    fluticasone-salmeterol (ADVAIR) 500-50 MCG/DOSE diskus inhaler, Inhale 1 puff into the lungs every 12 hours To use dos and bring, Disp: 1 each, Rfl: 5    cyanocobalamin 1000 MCG/ML injection, Inject 1 mL into the muscle every 30 days, Disp: 1 mL, Rfl: 5    insulin lispro, 1 Unit Dial, 100 UNIT/ML SOPN, Pt uses this on sliding scale  Tid. Breakfast, lunch and dinner Above 180-200 = 2 u 201-230=4 u 231-280=6u 281-300=8u 301-350=10u 350-400=12u >400 call doctor, Disp: 5 pen, Rfl: 5    amLODIPine (NORVASC) 10 MG tablet, Take 1 tablet by mouth daily, Disp: 30 tablet, Rfl: 5    atorvastatin (LIPITOR) 20 MG tablet, Take 1 tablet by mouth daily, Disp: 30 tablet, Rfl: 5    blood glucose test strips (ASCENSIA AUTODISC VI;ONE TOUCH ULTRA TEST VI) strip, 1 each by In Vitro route 4 times daily As needed. , Disp: 120 strip, Rfl: 5    furosemide (LASIX) 20 MG tablet, Take 1 tablet by mouth daily, Disp: 30 tablet, Rfl: 5    gabapentin (NEURONTIN) 300 MG capsule, Take 1 capsule by mouth nightly for 30 days. , Disp: 30 capsule, Rfl: 5    hydrALAZINE (APRESOLINE) 25 MG tablet, Take 1 tablet by mouth 3 times daily, Disp: 90 tablet, Rfl: 5    insulin glargine (LANTUS SOLOSTAR) 100 UNIT/ML injection pen, Inject 38 Units into the skin nightly, Disp: 5 pen, Rfl: 5    losartan (COZAAR) 100 MG tablet, Take 1 tablet by mouth daily, Disp: 30 tablet, Rfl: 5    potassium chloride (KLOR-CON M) 20 MEQ extended release tablet, Take 1 tablet by mouth 2 times daily, Disp: 60 tablet, Rfl: 5    traZODone (DESYREL) 150 MG tablet, Take 1 tablet by mouth nightly, Disp: 30 tablet, Rfl: 5    Handicap Placard MISC, by Does not apply route X 5 years, Disp: 1 each, Rfl: 0    Handicap Placard MISC, by Does not apply route, Disp: 1 each, Rfl: 0     Past Medical History:   Diagnosis Date    Bronchitis     recent, abx completed, has minimal cough at this time    Carpal tunnel syndrome, right     COPD (chronic obstructive pulmonary disease) (HCC)     Diabetes (HCC)     Foot drop, left     dt nerve damage    Hyperlipidemia     Hypertension     Neuropathy     left leg       Ayla Fast was seen today for leg pain and sinus problem.     Diagnoses and all orders for this visit:    Sinus congestion  -     amoxicillin-clavulanate (AUGMENTIN) 875-125 MG per tablet; Take 1 tablet by mouth 2 times daily for 7 days    Acute on chronic diastolic heart failure (HCC)    Pulmonary emphysema, unspecified emphysema type (HCC)    Chronic bilateral low back pain with left-sided sciatica  -     XR LUMBAR SPINE (2-3 VIEWS); Future  -     traMADol (ULTRAM) 50 MG tablet; Take 1 tablet by mouth every 8 hours as needed for Pain for up to 30 days. Left hip pain  -     XR HIP LEFT (2-3 VIEWS); Future  -     traMADol (ULTRAM) 50 MG tablet; Take 1 tablet by mouth every 8 hours as needed for Pain for up to 30 days.        Xrays today  Ok to continue Tramadol  Labs at end of month  F/u 1 month for blood pressure - he is in significant pain today    Kemar Sorenson MD

## 2021-08-13 RX ORDER — GABAPENTIN 300 MG/1
CAPSULE ORAL
Qty: 30 CAPSULE | Refills: 5 | Status: SHIPPED
Start: 2021-08-13 | End: 2021-12-23

## 2021-08-25 RX ORDER — TRAZODONE HYDROCHLORIDE 150 MG/1
150 TABLET ORAL NIGHTLY
Qty: 90 TABLET | Refills: 0 | Status: SHIPPED
Start: 2021-08-25 | End: 2021-11-03 | Stop reason: SDUPTHER

## 2021-08-25 RX ORDER — LOSARTAN POTASSIUM 100 MG/1
TABLET ORAL
Qty: 90 TABLET | Refills: 0 | Status: SHIPPED
Start: 2021-08-25 | End: 2021-11-03 | Stop reason: SDUPTHER

## 2021-08-25 NOTE — TELEPHONE ENCOUNTER
Medication(s) pended? [x] Yes  [] No    Last Appointment:  7/13/2021    Future appts:  No future appointments. Does patient need call back?   [] Yes  [x] No

## 2021-09-08 DIAGNOSIS — R53.83 FATIGUE, UNSPECIFIED TYPE: ICD-10-CM

## 2021-09-09 RX ORDER — ATORVASTATIN CALCIUM 20 MG/1
TABLET, FILM COATED ORAL
Qty: 30 TABLET | Refills: 5 | Status: SHIPPED
Start: 2021-09-09 | End: 2021-12-23

## 2021-09-09 RX ORDER — CYANOCOBALAMIN 1000 UG/ML
INJECTION INTRAMUSCULAR; SUBCUTANEOUS
Qty: 1 ML | Refills: 5 | Status: SHIPPED
Start: 2021-09-09 | End: 2021-09-22

## 2021-09-22 ENCOUNTER — OFFICE VISIT (OUTPATIENT)
Dept: PRIMARY CARE CLINIC | Age: 81
End: 2021-09-22
Payer: MEDICARE

## 2021-09-22 VITALS
HEIGHT: 72 IN | OXYGEN SATURATION: 96 % | TEMPERATURE: 97.3 F | SYSTOLIC BLOOD PRESSURE: 158 MMHG | DIASTOLIC BLOOD PRESSURE: 76 MMHG | WEIGHT: 236 LBS | HEART RATE: 70 BPM | BODY MASS INDEX: 31.97 KG/M2

## 2021-09-22 DIAGNOSIS — G89.29 CHRONIC PAIN OF RIGHT KNEE: ICD-10-CM

## 2021-09-22 DIAGNOSIS — M25.561 CHRONIC PAIN OF RIGHT KNEE: ICD-10-CM

## 2021-09-22 DIAGNOSIS — M25.552 LEFT HIP PAIN: ICD-10-CM

## 2021-09-22 DIAGNOSIS — I50.33 ACUTE ON CHRONIC DIASTOLIC HEART FAILURE (HCC): ICD-10-CM

## 2021-09-22 DIAGNOSIS — R29.898 WEAKNESS OF BOTH ARMS: Primary | ICD-10-CM

## 2021-09-22 DIAGNOSIS — M54.42 CHRONIC BILATERAL LOW BACK PAIN WITH LEFT-SIDED SCIATICA: ICD-10-CM

## 2021-09-22 DIAGNOSIS — R29.898 WEAKNESS OF BOTH LEGS: ICD-10-CM

## 2021-09-22 DIAGNOSIS — M25.551 RIGHT HIP PAIN: ICD-10-CM

## 2021-09-22 DIAGNOSIS — Z86.73 HISTORY OF CVA (CEREBROVASCULAR ACCIDENT): ICD-10-CM

## 2021-09-22 DIAGNOSIS — I35.8 AORTIC VALVE SCLEROSIS: ICD-10-CM

## 2021-09-22 DIAGNOSIS — H61.23 BILATERAL IMPACTED CERUMEN: ICD-10-CM

## 2021-09-22 DIAGNOSIS — G89.29 CHRONIC BILATERAL LOW BACK PAIN WITH LEFT-SIDED SCIATICA: ICD-10-CM

## 2021-09-22 DIAGNOSIS — J43.9 PULMONARY EMPHYSEMA, UNSPECIFIED EMPHYSEMA TYPE (HCC): ICD-10-CM

## 2021-09-22 DIAGNOSIS — R09.81 SINUS CONGESTION: ICD-10-CM

## 2021-09-22 PROCEDURE — G8427 DOCREV CUR MEDS BY ELIG CLIN: HCPCS | Performed by: FAMILY MEDICINE

## 2021-09-22 PROCEDURE — 1123F ACP DISCUSS/DSCN MKR DOCD: CPT | Performed by: FAMILY MEDICINE

## 2021-09-22 PROCEDURE — 4004F PT TOBACCO SCREEN RCVD TLK: CPT | Performed by: FAMILY MEDICINE

## 2021-09-22 PROCEDURE — 99214 OFFICE O/P EST MOD 30 MIN: CPT | Performed by: FAMILY MEDICINE

## 2021-09-22 PROCEDURE — G8417 CALC BMI ABV UP PARAM F/U: HCPCS | Performed by: FAMILY MEDICINE

## 2021-09-22 PROCEDURE — 69209 REMOVE IMPACTED EAR WAX UNI: CPT | Performed by: FAMILY MEDICINE

## 2021-09-22 PROCEDURE — G0008 ADMIN INFLUENZA VIRUS VAC: HCPCS | Performed by: FAMILY MEDICINE

## 2021-09-22 PROCEDURE — 4040F PNEUMOC VAC/ADMIN/RCVD: CPT | Performed by: FAMILY MEDICINE

## 2021-09-22 PROCEDURE — 90694 VACC AIIV4 NO PRSRV 0.5ML IM: CPT | Performed by: FAMILY MEDICINE

## 2021-09-22 PROCEDURE — G8926 SPIRO NO PERF OR DOC: HCPCS | Performed by: FAMILY MEDICINE

## 2021-09-22 PROCEDURE — 3023F SPIROM DOC REV: CPT | Performed by: FAMILY MEDICINE

## 2021-09-22 RX ORDER — DOXYCYCLINE HYCLATE 100 MG
100 TABLET ORAL 2 TIMES DAILY
Qty: 20 TABLET | Refills: 0 | Status: SHIPPED | OUTPATIENT
Start: 2021-09-22 | End: 2021-10-02

## 2021-09-22 NOTE — PROGRESS NOTES
[facility]    Annette Reyes presents to the office today for   Chief Complaint   Patient presents with    Other     mobility exam     Here for Mobility Exam for Power Mobility Device  Unable to use his wheeled walker any longer effectively due to severe leg weakness, left worse than right  He has 3+ pitting edema bilaterally  Very difficult for him to shift his weight due to leg weakness  He has to roll onto his bed to transfer  He is currently using a wheeled walker with a seat but is unable to use it effectively due to leg weakness and back and hip pain  He is unsteady and a high fall risk even with wheeled walker  See physical exam for upper and lower extremity assessment  He cannot walk 5 feet without requiring a stop    See assessment and plan for diagnoses that impact his needs    PMD is necessary to get to the bathroom to toilet/ bathe, to get to the kitchen to prepare meals/cook/eat, and get to the bedroom to groom/dress. Patient cannot use walker or cane anymore due to 2/5 strength in bilateral extremities and a history of falls  Last fall early summer 2021    Cannot use a manual wheelchair due to 1/5 strength in upper extremities and  strength 2/5    Patient cannot operate the tiller of a POV    He will be using the power mobility device at home and in the community  He can safely use the power mobility device physically and mentally    He is very willing and motivated to use the power mobility device at home and in the community. He will require elevating leg rests due to chronic severe edema from congestive heart failure    Review of Systems     BP (!) 158/76   Pulse 70   Temp 97.3 °F (36.3 °C) (Temporal)   Ht 6' (1.829 m)   Wt 236 lb (107 kg)   SpO2 96%   BMI 32.01 kg/m²   Physical Exam  HENT:      Head: Normocephalic and atraumatic. Eyes:      Extraocular Movements: Extraocular movements intact.       Conjunctiva/sclera: Conjunctivae normal.   Cardiovascular:      Rate and Rhythm: Normal rate. Heart sounds: Normal heart sounds. Pulmonary:      Effort: Pulmonary effort is normal.      Breath sounds: Normal breath sounds. Skin:     General: Skin is warm. Neurological:      Mental Status: He is alert and oriented to person, place, and time. Comments: RUE strength 1/5, LUE strength 1/5  RLE strength 2/5, LLE strength 2/5   strength 2/5  Pain 7-8/10  Very difficult for him to stand due to arm weakness trying to push out of a chair  Cannot walk upright at all due to chronic back pain  Leans forward severely to use wheeled walker  Macy is shuffling with wheeled walker  History of falls   Psychiatric:         Mood and Affect: Mood normal.         Behavior: Behavior normal.            Current Outpatient Medications:     doxycycline hyclate (VIBRA-TABS) 100 MG tablet, Take 1 tablet by mouth 2 times daily for 10 days, Disp: 20 tablet, Rfl: 0    Insulin Pen Needle 32G X 4 MM MISC, 1 each by Does not apply route 4 times daily, Disp: 120 each, Rfl: 5    atorvastatin (LIPITOR) 20 MG tablet, take 1 tablet by mouth once daily, Disp: 30 tablet, Rfl: 5    gabapentin (NEURONTIN) 300 MG capsule, take 1 capsule by mouth nightly, Disp: 30 capsule, Rfl: 5    albuterol (PROVENTIL) (2.5 MG/3ML) 0.083% nebulizer solution, Take 3 mLs by nebulization every 6 hours as needed for Wheezing To use dos if needs, Disp: 120 each, Rfl: 5    fluticasone-salmeterol (ADVAIR) 500-50 MCG/DOSE diskus inhaler, Inhale 1 puff into the lungs every 12 hours To use dos and bring, Disp: 1 each, Rfl: 5    insulin lispro, 1 Unit Dial, 100 UNIT/ML SOPN, Pt uses this on sliding scale  Tid.  Breakfast, lunch and dinner Above 180-200 = 2 u 201-230=4 u 231-280=6u 281-300=8u 301-350=10u 350-400=12u >400 call doctor, Disp: 5 pen, Rfl: 5    amLODIPine (NORVASC) 10 MG tablet, Take 1 tablet by mouth daily, Disp: 30 tablet, Rfl: 5    blood glucose test strips (ASCENSIA AUTODISC VI;ONE TOUCH ULTRA TEST VI) strip, 1 each by In Vitro route 4 times daily As needed. , Disp: 120 strip, Rfl: 5    furosemide (LASIX) 20 MG tablet, Take 1 tablet by mouth daily, Disp: 30 tablet, Rfl: 5    hydrALAZINE (APRESOLINE) 25 MG tablet, Take 1 tablet by mouth 3 times daily, Disp: 90 tablet, Rfl: 5    insulin glargine (LANTUS SOLOSTAR) 100 UNIT/ML injection pen, Inject 38 Units into the skin nightly, Disp: 5 pen, Rfl: 5    potassium chloride (KLOR-CON M) 20 MEQ extended release tablet, Take 1 tablet by mouth 2 times daily, Disp: 60 tablet, Rfl: 5    Handicap Placard MISC, by Does not apply route X 5 years, Disp: 1 each, Rfl: 0    Handicap Placard MISC, by Does not apply route, Disp: 1 each, Rfl: 0    traZODone (DESYREL) 150 MG tablet, take 1 tablet by mouth nightly, Disp: 90 tablet, Rfl: 0    losartan (COZAAR) 100 MG tablet, take 1 tablet by mouth once daily, Disp: 90 tablet, Rfl: 0    pramipexole (MIRAPEX) 0.125 MG tablet, Take 1 tablet by mouth nightly, Disp: 30 tablet, Rfl: 5     Past Medical History:   Diagnosis Date    Bronchitis     recent, abx completed, has minimal cough at this time    Carpal tunnel syndrome, right     COPD (chronic obstructive pulmonary disease) (HCC)     Diabetes (HCC)     Foot drop, left     dt nerve damage    Hyperlipidemia     Hypertension     Neuropathy     left leg       Dick Westbrook was seen today for other.     Diagnoses and all orders for this visit:    Weakness of both arms  -     DME Order for (Specify) as OP    Weakness of both legs  -     DME Order for (Specify) as OP    Acute on chronic diastolic heart failure ([facility] [address]  -     DME Order for (Specify) as OP    Left hip pain  -     DME Order for (Specify) as OP    Chronic bilateral low back pain with left-sided sciatica  -     DME Order for (Specify) as OP    Pulmonary emphysema, unspecified emphysema type ([facility] [address]  -     DME Order for (Specify) as OP    History of CVA (cerebrovascular accident)  -     DME Order for (Specify) as OP    Right hip pain  -     DME Order for [facility]) as OP    Aortic valve sclerosis  -     DME Order for (Specify) as OP    Sinus congestion  -     doxycycline hyclate (VIBRA-TABS) 100 MG tablet;  Take 1 tablet by mouth 2 times daily for 10 days    Bilateral impacted cerumen       Cerumen lavage bilaterally by LPN tolerated well  Power mobility device paperwork completed    Ramirez Will MD

## 2021-10-06 RX ORDER — HYDRALAZINE HYDROCHLORIDE 25 MG/1
TABLET, FILM COATED ORAL
Qty: 90 TABLET | Refills: 5 | Status: SHIPPED
Start: 2021-10-06 | End: 2021-12-23

## 2021-11-02 DIAGNOSIS — M79.89 LEG SWELLING: ICD-10-CM

## 2021-11-02 DIAGNOSIS — R79.89 ELEVATED BRAIN NATRIURETIC PEPTIDE (BNP) LEVEL: ICD-10-CM

## 2021-11-03 ENCOUNTER — TELEPHONE (OUTPATIENT)
Dept: PRIMARY CARE CLINIC | Age: 81
End: 2021-11-03

## 2021-11-03 DIAGNOSIS — R29.898 WEAKNESS OF BOTH LEGS: Primary | ICD-10-CM

## 2021-11-03 DIAGNOSIS — J32.9 RECURRENT SINUSITIS: ICD-10-CM

## 2021-11-03 RX ORDER — LOSARTAN POTASSIUM 100 MG/1
100 TABLET ORAL DAILY
Qty: 90 TABLET | Refills: 5 | Status: ON HOLD
Start: 2021-11-03 | End: 2021-12-27 | Stop reason: HOSPADM

## 2021-11-03 RX ORDER — TRAZODONE HYDROCHLORIDE 150 MG/1
150 TABLET ORAL NIGHTLY
Qty: 90 TABLET | Refills: 0 | Status: SHIPPED
Start: 2021-11-03 | End: 2021-12-08

## 2021-11-03 RX ORDER — FUROSEMIDE 20 MG/1
TABLET ORAL
Qty: 90 TABLET | OUTPATIENT
Start: 2021-11-03

## 2021-11-03 RX ORDER — DOXYCYCLINE HYCLATE 100 MG
100 TABLET ORAL 2 TIMES DAILY
Qty: 20 TABLET | Refills: 0 | Status: SHIPPED | OUTPATIENT
Start: 2021-11-03 | End: 2021-11-13

## 2021-11-03 RX ORDER — LOSARTAN POTASSIUM 100 MG/1
TABLET ORAL
Qty: 90 TABLET | Refills: 0 | OUTPATIENT
Start: 2021-11-03

## 2021-11-03 RX ORDER — TRAZODONE HYDROCHLORIDE 150 MG/1
150 TABLET ORAL NIGHTLY
Qty: 90 TABLET | Refills: 0 | OUTPATIENT
Start: 2021-11-03 | End: 2021-12-03

## 2021-11-03 NOTE — TELEPHONE ENCOUNTER
Medication(s) pended? [x] Yes  [] No    Last Appointment:  9/22/2021    Future appts:  Future Appointments   Date Time Provider Rickey Cevallos   12/22/2021 11:00 AM Jovanny Welsh MD [facility]          Does patient need call back?   [] Yes  [x] No

## 2021-11-03 NOTE — TELEPHONE ENCOUNTER
Patient stopped in today stating that the [facility] [employer] is requesting a D/c order for Wheel Chair , patient states he can't use it, can't get it out of his house, and has a gravel drive way. States he can't afford for it to sit at his home any longer. Also, patient requesting provider to send an RX to his pharmacy for sinuses and drainage. States he has an appointment with an ear doctor in [facility] for hearing aids, doesn't remember provider or facility name, confirmed he would call in to let us know as asked in case he could be referred there for sinuses too.  Please advise. Fax number patient was giver for [facility]  F# [phone].

## 2021-11-22 ENCOUNTER — OFFICE VISIT (OUTPATIENT)
Dept: FAMILY MEDICINE CLINIC | Age: 81
End: 2021-11-22
Payer: MEDICARE

## 2021-11-22 VITALS
TEMPERATURE: 97.7 F | HEART RATE: 78 BPM | DIASTOLIC BLOOD PRESSURE: 80 MMHG | SYSTOLIC BLOOD PRESSURE: 150 MMHG | OXYGEN SATURATION: 97 %

## 2021-11-22 DIAGNOSIS — R07.9 CHEST PAIN, UNSPECIFIED TYPE: ICD-10-CM

## 2021-11-22 DIAGNOSIS — R29.898 LEFT ARM WEAKNESS: Primary | ICD-10-CM

## 2021-11-22 PROCEDURE — G8417 CALC BMI ABV UP PARAM F/U: HCPCS | Performed by: STUDENT IN AN ORGANIZED HEALTH CARE EDUCATION/TRAINING PROGRAM

## 2021-11-22 PROCEDURE — 4004F PT TOBACCO SCREEN RCVD TLK: CPT | Performed by: STUDENT IN AN ORGANIZED HEALTH CARE EDUCATION/TRAINING PROGRAM

## 2021-11-22 PROCEDURE — 4040F PNEUMOC VAC/ADMIN/RCVD: CPT | Performed by: STUDENT IN AN ORGANIZED HEALTH CARE EDUCATION/TRAINING PROGRAM

## 2021-11-22 PROCEDURE — 1123F ACP DISCUSS/DSCN MKR DOCD: CPT | Performed by: STUDENT IN AN ORGANIZED HEALTH CARE EDUCATION/TRAINING PROGRAM

## 2021-11-22 PROCEDURE — G8427 DOCREV CUR MEDS BY ELIG CLIN: HCPCS | Performed by: STUDENT IN AN ORGANIZED HEALTH CARE EDUCATION/TRAINING PROGRAM

## 2021-11-22 PROCEDURE — 99215 OFFICE O/P EST HI 40 MIN: CPT | Performed by: STUDENT IN AN ORGANIZED HEALTH CARE EDUCATION/TRAINING PROGRAM

## 2021-11-22 PROCEDURE — 93000 ELECTROCARDIOGRAM COMPLETE: CPT | Performed by: STUDENT IN AN ORGANIZED HEALTH CARE EDUCATION/TRAINING PROGRAM

## 2021-11-22 PROCEDURE — G8484 FLU IMMUNIZE NO ADMIN: HCPCS | Performed by: STUDENT IN AN ORGANIZED HEALTH CARE EDUCATION/TRAINING PROGRAM

## 2021-11-22 ASSESSMENT — ENCOUNTER SYMPTOMS
COUGH: 1
SHORTNESS OF BREATH: 1

## 2021-11-22 NOTE — PROGRESS NOTES
WALK-IN CARE CLINIC VISIT    21  Name: Rod Almanza   : 1940   Age: 80 y.o. Sex: male        Assessment & Plan:       ICD-10-CM    1. Left arm weakness  R29.898    2. Chest pain, unspecified type  R07.9 EKG 12 lead     EKG 12 lead       Patient unable to lift left arm. Also with intermittent atypical chest pain. EKG without evidence of acute ischemia. Differential diagnosis for arm weakness and pain include CVA, dislocation or other injury to left arm, infectious arthritis. Will send to ED to rule out CVA or other cause of left arm weakness. Even if CVA ruled out, may also need  or rehabilitation given poor ambulation at baseline and now unable to use left arm to help him ambulate. Given duration of symptoms and non-ischemic EKG, OK to go by private car with son to ED. Advised to go directly to [address] [place] [address] ED. The patient verbalizes understanding, and is in agreement with the plan as detailed above. All educational materials and instructions were discussed and included on the After Visit Summary. All questions answered to the patient's satisfaction. The patient was advised to call for any concerns. This provider was wearing N95 mask and shield. The patient was wearing surgical mask. We practiced social distancing when appropriate during visit due to COVID-19 pandemic. Subjective:     Chief Complaint   Patient presents with    Arm Pain     left arm pain started friday, no injury that he is aware of. Patient reports unable to lift left arm for 1 week  Now also having left shoulder and arm pain for 2-3 days  Has chronic left leg weakness after back surgery  Required wheelchair to get into office as he usually uses bilateral canes to assist in ambulation and now unable to use left arm  Has also had some intermittent chest pain  Was sitting when he had chest pain    Review of Systems   Constitutional: Negative for chills and fever. HENT: Positive for congestion. Eyes: Negative for visual disturbance. Respiratory: Positive for cough and shortness of breath. Cardiovascular: Positive for chest pain. Neurological: Positive for weakness (left arm). Negative for numbness and headaches. Medical History:     Patient Active Problem List   Diagnosis    Emphysema of lung ([address]    Diabetes mellitus ([address]    Essential hypertension    History of CVA (cerebrovascular accident)    Stage 3b chronic kidney disease ([address]    Chronic pain syndrome    Acute on chronic diastolic heart failure (HCC)        Past Medical History:   Diagnosis Date    Bronchitis     recent, abx completed, has minimal cough at this time    Carpal tunnel syndrome, right     COPD (chronic obstructive pulmonary disease) ([address]     Diabetes ([address]     Foot drop, left     dt nerve damage    Hyperlipidemia     Hypertension     Neuropathy     left leg       Past Surgical History:   Procedure Laterality Date    APPENDECTOMY      BACK SURGERY  2012    lumbar    CARPAL TUNNEL RELEASE Right 04/26/2017    JOINT REPLACEMENT Bilateral 1989    knees       No family history on file.     Medications:     Current Outpatient Medications:     traZODone (DESYREL) 150 MG tablet, Take 1 tablet by mouth nightly, Disp: 90 tablet, Rfl: 0    losartan (COZAAR) 100 MG tablet, Take 1 tablet by mouth daily, Disp: 90 tablet, Rfl: 5    hydrALAZINE (APRESOLINE) 25 MG tablet, take 1 tablet by mouth three times a day, Disp: 90 tablet, Rfl: 5    Handicap Placard MISC, by Does not apply route X 5 years, Disp: 1 each, Rfl: 0    atorvastatin (LIPITOR) 20 MG tablet, take 1 tablet by mouth once daily, Disp: 30 tablet, Rfl: 5    gabapentin (NEURONTIN) 300 MG capsule, take 1 capsule by mouth nightly, Disp: 30 capsule, Rfl: 5    albuterol (PROVENTIL) (2.5 MG/3ML) 0.083% nebulizer solution, Take 3 mLs by nebulization every 6 hours as needed for Wheezing To use dos if needs, Disp: 120 each, Rfl: 5    insulin lispro, 1 Unit Dial, 100 UNIT/ML SOPN, Pt uses this on sliding scale  Tid. Breakfast, lunch and dinner Above 180-200 = 2 u 201-230=4 u 231-280=6u 281-300=8u 301-350=10u 350-400=12u >400 call doctor, Disp: 5 pen, Rfl: 5    amLODIPine (NORVASC) 10 MG tablet, Take 1 tablet by mouth daily, Disp: 30 tablet, Rfl: 5    blood glucose test strips (ASCENSIA AUTODISC VI;ONE TOUCH ULTRA TEST VI) strip, 1 each by In Vitro route 4 times daily As needed. , Disp: 120 strip, Rfl: 5    furosemide (LASIX) 20 MG tablet, Take 1 tablet by mouth daily, Disp: 30 tablet, Rfl: 5    Insulin Pen Needle 32G X 4 MM MISC, 1 each by Does not apply route 4 times daily, Disp: 120 each, Rfl: 5    pramipexole (MIRAPEX) 0.125 MG tablet, Take 1 tablet by mouth nightly, Disp: 30 tablet, Rfl: 5    fluticasone-salmeterol (ADVAIR) 500-50 MCG/DOSE diskus inhaler, Inhale 1 puff into the lungs every 12 hours To use dos and bring, Disp: 1 each, Rfl: 5    insulin glargine (LANTUS SOLOSTAR) 100 UNIT/ML injection pen, Inject 38 Units into the skin nightly, Disp: 5 pen, Rfl: 5    potassium chloride (KLOR-CON M) 20 MEQ extended release tablet, Take 1 tablet by mouth 2 times daily, Disp: 60 tablet, Rfl: 5    Allergies:   No Known Allergies    Social History:     Social History     Socioeconomic History    Marital status:      Spouse name: Not on file    Number of children: Not on file    Years of education: Not on file    Highest education level: Not on file   Occupational History    Not on file   Tobacco Use    Smoking status: Former Smoker     Packs/day: 1.00     Years: 9.00     Pack years: 9.00     Start date: 1960     Quit date: 3/17/1969     Years since quittin.7    Smokeless tobacco: Current User     Types: Snuff   Substance and Sexual Activity    Alcohol use: No    Drug use: No    Sexual activity: Not on file   Other Topics Concern    Not on file   Social History Narrative    Not on file     Social Determinants of Health     Financial Resource Strain:     Difficulty of Paying Living Expenses: Not on file   Food Insecurity:     Worried About Running Out of Food in the Last Year: Not on file    Stephon of Food in the Last Year: Not on file   Transportation Needs:     Lack of Transportation (Medical): Not on file    Lack of Transportation (Non-Medical): Not on file   Physical Activity:     Days of Exercise per Week: Not on file    Minutes of Exercise per Session: Not on file   Stress:     Feeling of Stress : Not on file   Social Connections:     Frequency of Communication with Friends and Family: Not on file    Frequency of Social Gatherings with Friends and Family: Not on file    Attends Confucianist Services: Not on file    Active Member of [address] [place] [address] [employer] or Organizations: Not on file    Attends Club or Organization Meetings: Not on file    Marital Status: Not on file   Intimate Partner Violence:     Fear of Current or Ex-Partner: Not on file    Emotionally Abused: Not on file    Physically Abused: Not on file    Sexually Abused: Not on file   Housing Stability:     Unable to Pay for Housing in the Last Year: Not on file    Number of [place] in the Last Year: Not on file    Unstable Housing in the Last Year: Not on file       Physical Exam:     Vitals:    11/22/21 1418   BP: (!) 150/80   Pulse: 78   Temp: 97.7 °F (36.5 °C)   SpO2: 97%        Physical Exam  Vitals and nursing note reviewed. Constitutional:       General: He is not in acute distress. Appearance: Normal appearance. He is obese. He is not ill-appearing or diaphoretic. Eyes:      Extraocular Movements: Extraocular movements intact. Conjunctiva/sclera: Conjunctivae normal.   Cardiovascular:      Rate and Rhythm: Normal rate and regular rhythm. Heart sounds: Normal heart sounds. Pulmonary:      Effort: Pulmonary effort is normal. No respiratory distress. Breath sounds: Wheezing (scattered inspiratory and expiratory) present.    Musculoskeletal: Comments: Tenderness to posterior and anterior left shoulder. Unable to lift left arm at shoulder. Passive ROM normal.   Skin:     General: Skin is warm and dry. Neurological:      Mental Status: He is alert and oriented to person, place, and time. Cranial Nerves: No cranial nerve deficit. Sensory: No sensory deficit. Motor: Weakness (left shoulder 0/5 strength, left elbow 4/5 strength, right arm 5/5, left leg 4/5 chronic, right leg 5-/5) present. Coordination: Coordination abnormal (left arm and left leg). Gait: Abnormal gait: unable to assess, required wheelchair. Deep Tendon Reflexes: Reflexes normal.         Testing:     Orders Placed This Encounter   Procedures    EKG 12 lead     Standing Status:   Future     Number of Occurrences:   1     Standing Expiration Date:   1/21/2022     Order Specific Question:   Reason for Exam?     Answer:   Chest pain        No results found for this or any previous visit (from the past 24 hour(s)). EKG: normal sinus rhythm, RBBB and left bifascicular block, there are no previous tracings available for comparison.

## 2021-12-01 DIAGNOSIS — R79.89 ELEVATED BRAIN NATRIURETIC PEPTIDE (BNP) LEVEL: ICD-10-CM

## 2021-12-01 DIAGNOSIS — M79.89 LEG SWELLING: ICD-10-CM

## 2021-12-08 RX ORDER — FUROSEMIDE 20 MG/1
TABLET ORAL
Qty: 90 TABLET | Refills: 1 | Status: SHIPPED
Start: 2021-12-08 | End: 2021-12-23

## 2021-12-08 RX ORDER — BLOOD SUGAR DIAGNOSTIC
STRIP MISCELLANEOUS
Qty: 200 STRIP | Refills: 5 | Status: SHIPPED
Start: 2021-12-08 | End: 2021-12-23 | Stop reason: ALTCHOICE

## 2021-12-08 RX ORDER — TRAZODONE HYDROCHLORIDE 150 MG/1
TABLET ORAL
Qty: 90 TABLET | Refills: 0 | Status: SHIPPED
Start: 2021-12-08 | End: 2021-12-23

## 2021-12-12 ENCOUNTER — APPOINTMENT (OUTPATIENT)
Dept: GENERAL RADIOLOGY | Age: 81
End: 2021-12-12
Payer: MEDICARE

## 2021-12-12 ENCOUNTER — HOSPITAL ENCOUNTER (EMERGENCY)
Age: 81
Discharge: HOME | End: 2021-12-12
Payer: MEDICARE

## 2021-12-12 VITALS
BODY MASS INDEX: 31.97 KG/M2 | WEIGHT: 236 LBS | SYSTOLIC BLOOD PRESSURE: 170 MMHG | DIASTOLIC BLOOD PRESSURE: 80 MMHG | TEMPERATURE: 97.7 F | HEART RATE: 66 BPM | HEIGHT: 72 IN | OXYGEN SATURATION: 96 % | RESPIRATION RATE: 14 BRPM
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
## 2021-12-12 DIAGNOSIS — M25.512 ACUTE PAIN OF LEFT SHOULDER: ICD-10-CM

## 2021-12-12 DIAGNOSIS — M25.552 LEFT HIP PAIN: ICD-10-CM

## 2021-12-12 PROCEDURE — 73502 X-RAY EXAM HIP UNI 2-3 VIEWS: CPT

## 2021-12-12 PROCEDURE — 73030 X-RAY EXAM OF SHOULDER: CPT

## 2021-12-12 PROCEDURE — 6370000000 HC RX 637 (ALT 250 FOR IP): Performed by: PHYSICIAN ASSISTANT

## 2021-12-12 PROCEDURE — 99284 EMERGENCY DEPT VISIT MOD MDM: CPT

## 2021-12-12 PROCEDURE — 72100 X-RAY EXAM L-S SPINE 2/3 VWS: CPT

## 2021-12-12 PROCEDURE — 73060 X-RAY EXAM OF HUMERUS: CPT

## 2021-12-12 RX ORDER — HYDROCODONE BITARTRATE AND ACETAMINOPHEN 5; 325 MG/1; MG/1
1 TABLET ORAL ONCE
Status: COMPLETED | OUTPATIENT
Start: 2021-12-12 | End: 2021-12-12

## 2021-12-12 RX ORDER — HYDROCODONE BITARTRATE AND ACETAMINOPHEN 5; 325 MG/1; MG/1
1 TABLET ORAL EVERY 6 HOURS PRN
Qty: 8 TABLET | Refills: 0 | Status: SHIPPED | OUTPATIENT
Start: 2021-12-12 | End: 2021-12-14

## 2021-12-12 RX ADMIN — HYDROCODONE BITARTRATE AND ACETAMINOPHEN 1 TABLET: 5; 325 TABLET ORAL at 15:05

## 2021-12-12 ASSESSMENT — PAIN DESCRIPTION - ORIENTATION: ORIENTATION: LEFT

## 2021-12-12 ASSESSMENT — PAIN DESCRIPTION - PAIN TYPE: TYPE: ACUTE PAIN

## 2021-12-12 ASSESSMENT — PAIN DESCRIPTION - LOCATION: LOCATION: HIP;SHOULDER

## 2021-12-12 ASSESSMENT — PAIN SCALES - GENERAL
PAINLEVEL_OUTOF10: 9
PAINLEVEL_OUTOF10: 9

## 2021-12-12 ASSESSMENT — PAIN DESCRIPTION - FREQUENCY: FREQUENCY: CONTINUOUS

## 2021-12-12 NOTE — ED PROVIDER NOTES
[facility]     Department of Emergency Medicine   ED  Provider Note  Admit Date/RoomTime: 12/12/2021  2:46 PM  ED Room: 36/36    Chief Complaint   Hip Pain [place] about one week ago from standing position, no loc, pt complaining of left hip pain getting worse) and Shoulder Pain (From fall, getting worse could not sleep last night due to the pain)    History of Present Illness      Dat Cerna is a 80 y.o. old male who presents to the ED for left hip and shoulder pain. Patient states he fell about 1 week ago from a standing position. He states he does have dropfoot which causes him to lose his balance occasionally. He denies hitting his head or having any loss of consciousness. He denies any headache, dizziness, or vision changes. He is not on anticoagulation. He has no chest pain, shortness of breath, pain with breathing, neck pain, back pain, numbness/tingling, sensation changes, loss of bowel or bladder, urinary complaints, or new trauma/injury. Patient states the pain in his shoulder prevented him from sleeping well last night. He states the pain in his hip is worse than his shoulder. He has still been able to ambulate independently but does have some difficulty due to pain. Patient is alert and oriented x3 and in no apparent distress at this exam.  He is nontoxic-appearing. ROS   Pertinent positives and negatives are stated within HPI, all other systems reviewed and are negative. Past Medical History:  has a past medical history of Bronchitis, Carpal tunnel syndrome, right, COPD (chronic obstructive pulmonary disease) ([facility] [address] Diabetes ([address] Foot drop, left, Hyperlipidemia, Hypertension, and Neuropathy. Past Surgical History:  has a past surgical history that includes Appendectomy; back surgery (2012); joint replacement (Bilateral, 1989); and Carpal tunnel release (Right, 04/26/2017). Social History:  reports that he quit smoking about 52 years ago. He started smoking about 61 years ago. He has a 9.00 pack-year smoking history. His smokeless tobacco use includes snuff. He reports that he does not drink alcohol and does not use drugs. Family History: family history is not on file. The patients home medications have been reviewed. Allergies: Patient has no known allergies. Allergies have been reviewed with patient. Physical Exam   VS:  BP (!) 170/80   Pulse 66   Temp 97.7 °F (36.5 °C) (Temporal)   Resp 14   Ht 6' (1.829 m)   Wt 236 lb (107 kg)   SpO2 96%   BMI 32.01 kg/m²     Oxygen Saturation Interpretation: Normal.    Constitutional:  Alert and oriented x4, development consistent with age, NAD  HEENT:  NC/NT. No bruising or lacerations, No blood noted in nares or ears, EOMI, PERRLA, Airway patent. Neck:  No midline or paravertebral tenderness. No crepitus   Chest:  Symmetrical without visible rash or tenderness. No bruising   Respiratory:  Clear and equal bilaterally with good airflow, No respiratory distress    CV:  Regular rate and rhythm  GI:  Abdomen soft, nontender, No firm or pulsatile mass, No guarding or rigidity   Pelvis:  Stable, nontender to palpation. Pain with palpation to left greater trochanter  Back:  No midline or paravertebral tenderness. No step-offs. No costovertebral tenderness. Extremities: Limited RoM of left hip secondary to pain, tenderness with palpation to left greater trochanter, tenderness to left AC joint and proximal humerus, limited range of motion of shoulder secondary to pain, intact sensations distally  Integument:  Normal turgor. Warm, dry, without visible rash, unless noted elsewhere. Neurological:  GCS 15, CN II-XII intact, Motor functions intact. Lab / Imaging Results   (All laboratory and radiology results have been personally reviewed by myself)  Labs:  No results found for this visit on 12/12/21. Imaging: All Radiology results interpreted by Radiologist unless otherwise noted.   XR SHOULDER LEFT (MIN 2 VIEWS)   Final Result No acute fracture or dislocation in the left shoulder and left humerus. XR HUMERUS LEFT (MIN 2 VIEWS)   Final Result   No acute fracture or dislocation in the left shoulder and left humerus. XR HIP 2-3 VW W PELVIS LEFT   Final Result   No acute abnormality of the hip. XR LUMBAR SPINE (2-3 VIEWS)   Final Result   No evidence of acute lumbar spine trauma      Straightening of the lumbar spine with multilevel degenerative changes. ED Course / Medical Decision Making     Medications   HYDROcodone-acetaminophen (NORCO) 5-325 MG per tablet 1 tablet (1 tablet Oral Given 12/12/21 1505)     Re-examination:   Patients symptoms are improving. Consult(s):  None    Procedure(s):  none    MDM:       Counseling: The emergency provider has spoken with the patient/caregiver and discussed todays results, in addition to providing specific details for the plan of care and counseling regarding the diagnosis and prognosis. Questions are answered at this time and they are agreeable with the plan. All results reviewed with pt and all questions answered. Patient understands that he must follow-up with PCP. Patient was advised to return to ED if symptoms worsen or new symptoms develop. Pt remained nontoxic, afebrile, and A&O x4 during this ED visit. They agreed with plan of care, discharge, and importance of follow-up. Pt was in no distress at discharge. Vitals stable. They were educated on newly prescribed medications. Patient states he feels comfortable returning home    Assessment      1. Fall on same level from slipping, tripping or stumbling, initial encounter    2. Acute pain of left shoulder    3.  Left hip pain      Plan   Discharge to home  Patient condition is good    New Medications     Discharge Medication List as of 12/12/2021  5:39 PM      START taking these medications    Details   HYDROcodone-acetaminophen (NORCO) 5-325 MG per tablet Take 1 tablet by mouth every 6 hours as needed for Pain for up to 2 days. , Disp-8 tablet, R-0Print             Electronically signed by Daniela Downs PA-C   DD: 12/12/21    **This report was transcribed using voice recognition software. Every effort was made to ensure accuracy; however, inadvertent computerized transcription errors may be present.     END OF ED PROVIDER NOTE       Daniela Downs PA-C  12/12/21 9981

## 2021-12-15 ENCOUNTER — OFFICE VISIT (OUTPATIENT)
Dept: PRIMARY CARE CLINIC | Age: 81
End: 2021-12-15
Payer: MEDICARE

## 2021-12-15 VITALS
TEMPERATURE: 97.2 F | HEART RATE: 79 BPM | SYSTOLIC BLOOD PRESSURE: 138 MMHG | OXYGEN SATURATION: 98 % | BODY MASS INDEX: 32.51 KG/M2 | WEIGHT: 240 LBS | HEIGHT: 72 IN | DIASTOLIC BLOOD PRESSURE: 70 MMHG

## 2021-12-15 DIAGNOSIS — J43.9 PULMONARY EMPHYSEMA, UNSPECIFIED EMPHYSEMA TYPE (HCC): ICD-10-CM

## 2021-12-15 DIAGNOSIS — R79.89 ELEVATED BRAIN NATRIURETIC PEPTIDE (BNP) LEVEL: ICD-10-CM

## 2021-12-15 DIAGNOSIS — R29.898 WEAKNESS OF BOTH LEGS: Primary | ICD-10-CM

## 2021-12-15 DIAGNOSIS — I50.33 ACUTE ON CHRONIC DIASTOLIC HEART FAILURE (HCC): ICD-10-CM

## 2021-12-15 PROCEDURE — 4004F PT TOBACCO SCREEN RCVD TLK: CPT | Performed by: FAMILY MEDICINE

## 2021-12-15 PROCEDURE — G8484 FLU IMMUNIZE NO ADMIN: HCPCS | Performed by: FAMILY MEDICINE

## 2021-12-15 PROCEDURE — 99214 OFFICE O/P EST MOD 30 MIN: CPT | Performed by: FAMILY MEDICINE

## 2021-12-15 PROCEDURE — G8427 DOCREV CUR MEDS BY ELIG CLIN: HCPCS | Performed by: FAMILY MEDICINE

## 2021-12-15 PROCEDURE — 3023F SPIROM DOC REV: CPT | Performed by: FAMILY MEDICINE

## 2021-12-15 PROCEDURE — G8926 SPIRO NO PERF OR DOC: HCPCS | Performed by: FAMILY MEDICINE

## 2021-12-15 PROCEDURE — 1123F ACP DISCUSS/DSCN MKR DOCD: CPT | Performed by: FAMILY MEDICINE

## 2021-12-15 PROCEDURE — G8417 CALC BMI ABV UP PARAM F/U: HCPCS | Performed by: FAMILY MEDICINE

## 2021-12-15 PROCEDURE — 4040F PNEUMOC VAC/ADMIN/RCVD: CPT | Performed by: FAMILY MEDICINE

## 2021-12-15 RX ORDER — ALBUTEROL SULFATE 2.5 MG/3ML
2.5 SOLUTION RESPIRATORY (INHALATION) EVERY 6 HOURS PRN
Qty: 120 EACH | Refills: 5 | Status: SHIPPED
Start: 2021-12-15 | End: 2021-12-23

## 2021-12-15 NOTE — PROGRESS NOTES
[facility]    Toula Frankel presents to the office today for   Chief Complaint   Patient presents with   [facility] ED Follow-up     Here for ER follow up  Dale Apley and had negative xrays  Difficult time ambulating  He seeks placement in independent living  He has a  coming Friday from [facility] due to a call from his daughter    Review of Systems     /70   Pulse 79   Temp 97.2 °F (36.2 °C) (Temporal)   Ht 6' (1.829 m)   Wt 240 lb (108.9 kg)   SpO2 98%   BMI 32.55 kg/m²   Physical Exam  Constitutional:       Appearance: Normal appearance. HENT:      Head: Normocephalic and atraumatic. Eyes:      Extraocular Movements: Extraocular movements intact. Conjunctiva/sclera: Conjunctivae normal.   Cardiovascular:      Rate and Rhythm: Normal rate. Heart sounds: Normal heart sounds. Pulmonary:      Effort: Pulmonary effort is normal.   Skin:     General: Skin is warm. Neurological:      Mental Status: He is alert and oriented to person, place, and time.    Psychiatric:         Mood and Affect: Mood normal.         Behavior: Behavior normal.            Current Outpatient Medications:     albuterol (PROVENTIL) (2.5 MG/3ML) 0.083% nebulizer solution, Take 3 mLs by nebulization every 6 hours as needed for Wheezing To use dos if needs, Disp: 120 each, Rfl: 5    traZODone (DESYREL) 150 MG tablet, take 1 tablet by mouth at bedtime, Disp: 90 tablet, Rfl: 0    ONETOUCH VERIO strip, use 1 TEST STRIP to TEST BLOOD SUGAR four times a day, Disp: 200 strip, Rfl: 5    furosemide (LASIX) 20 MG tablet, take 1 tablet by mouth once daily, Disp: 90 tablet, Rfl: 1    ONETOUCH VERIO strip, use 1 TEST STRIP to TEST BLOOD SUGAR four times a day, Disp: 200 strip, Rfl: 5    losartan (COZAAR) 100 MG tablet, Take 1 tablet by mouth daily, Disp: 90 tablet, Rfl: 5    hydrALAZINE (APRESOLINE) 25 MG tablet, take 1 tablet by mouth three times a day, Disp: 90 tablet, Rfl: 5    Handicap Placard MISC, by Does not apply route X 5 years, Disp: 1 each, Rfl: 0    Insulin Pen Needle 32G X 4 MM MISC, 1 each by Does not apply route 4 times daily, Disp: 120 each, Rfl: 5    atorvastatin (LIPITOR) 20 MG tablet, take 1 tablet by mouth once daily, Disp: 30 tablet, Rfl: 5    gabapentin (NEURONTIN) 300 MG capsule, take 1 capsule by mouth nightly, Disp: 30 capsule, Rfl: 5    pramipexole (MIRAPEX) 0.125 MG tablet, Take 1 tablet by mouth nightly, Disp: 30 tablet, Rfl: 5    fluticasone-salmeterol (ADVAIR) 500-50 MCG/DOSE diskus inhaler, Inhale 1 puff into the lungs every 12 hours To use dos and bring, Disp: 1 each, Rfl: 5    insulin lispro, 1 Unit Dial, 100 UNIT/ML SOPN, Pt uses this on sliding scale  Tid. Breakfast, lunch and dinner Above 180-200 = 2 u 201-230=4 u 231-280=6u 281-300=8u 301-350=10u 350-400=12u >400 call doctor, Disp: 5 pen, Rfl: 5    amLODIPine (NORVASC) 10 MG tablet, Take 1 tablet by mouth daily, Disp: 30 tablet, Rfl: 5    insulin glargine (LANTUS SOLOSTAR) 100 UNIT/ML injection pen, Inject 38 Units into the skin nightly, Disp: 5 pen, Rfl: 5    potassium chloride (KLOR-CON M) 20 MEQ extended release tablet, Take 1 tablet by mouth 2 times daily, Disp: 60 tablet, Rfl: 5     Past Medical History:   Diagnosis Date    Bronchitis     recent, abx completed, has minimal cough at this time    Carpal tunnel syndrome, right     COPD (chronic obstructive pulmonary disease) (HCC)     Diabetes (HCC)     Foot drop, left     dt nerve damage    Hyperlipidemia     Hypertension     Neuropathy     left leg       Nicole Eldridge was seen today for ed follow-up.     Diagnoses and all orders for this visit:    Weakness of both legs  -     [facility]  -     DME Order for Wheel Chair as OP    Elevated brain natriuretic peptide (BNP) level  -     [facility]    Acute on chronic diastolic heart failure [facility])  -     [facility]  -     DME Order for Wheel Chair as OP    Pulmonary emphysema, unspecified emphysema type [facility])  -     [address]  -     albuterol (PROVENTIL) (2.5 MG/3ML) 0.083% nebulizer solution;  Take 3 mLs by nebulization every 6 hours as needed for Wheezing To use dos if needs  -     [facility] Order for Wheel Chair as [address] [facility]/  Wheelchair order given to patient as he had to use wheelchair in office today  APS consult pending    Lauren Glez MD

## 2021-12-21 ENCOUNTER — TELEPHONE (OUTPATIENT)
Dept: FAMILY MEDICINE CLINIC | Age: 81
End: 2021-12-21

## 2021-12-21 NOTE — TELEPHONE ENCOUNTER
[facility] [place]        She is calling to tell you that they have admitted him to their care today. Just FYI. They will be sending you a plan of care soon.

## 2021-12-22 ENCOUNTER — APPOINTMENT (OUTPATIENT)
Dept: GENERAL RADIOLOGY | Age: 81
DRG: 304 | End: 2021-12-22
Payer: MEDICARE

## 2021-12-22 ENCOUNTER — APPOINTMENT (OUTPATIENT)
Dept: CT IMAGING | Age: 81
DRG: 304 | End: 2021-12-22
Payer: MEDICARE

## 2021-12-22 ENCOUNTER — HOSPITAL ENCOUNTER (INPATIENT)
Age: 81
LOS: 4 days | Discharge: LEFT BEFORE DISCHARGE | DRG: 304 | End: 2021-12-27
Attending: STUDENT IN AN ORGANIZED HEALTH CARE EDUCATION/TRAINING PROGRAM | Admitting: INTERNAL MEDICINE
Payer: MEDICARE

## 2021-12-22 ENCOUNTER — TELEPHONE (OUTPATIENT)
Dept: PRIMARY CARE CLINIC | Age: 81
End: 2021-12-22

## 2021-12-22 DIAGNOSIS — N17.9 ACUTE RENAL FAILURE, UNSPECIFIED ACUTE RENAL FAILURE TYPE (HCC): ICD-10-CM

## 2021-12-22 DIAGNOSIS — I16.1 HYPERTENSIVE EMERGENCY: Primary | ICD-10-CM

## 2021-12-22 DIAGNOSIS — I50.9 ACUTE CONGESTIVE HEART FAILURE, UNSPECIFIED HEART FAILURE TYPE (HCC): ICD-10-CM

## 2021-12-22 LAB
ANION GAP SERPL CALCULATED.3IONS-SCNC: 13 MMOL/L (ref 7–16)
BUN BLDV-MCNC: 44 MG/DL (ref 6–23)
CALCIUM SERPL-MCNC: 8.6 MG/DL (ref 8.6–10.2)
CHLORIDE BLD-SCNC: 102 MMOL/L (ref 98–107)
CO2: 24 MMOL/L (ref 22–29)
CREAT SERPL-MCNC: 3.1 MG/DL (ref 0.7–1.2)
GFR AFRICAN AMERICAN: 23
GFR NON-AFRICAN AMERICAN: 19 ML/MIN/1.73
GLUCOSE BLD-MCNC: 171 MG/DL (ref 74–99)
HCT VFR BLD CALC: 34.3 % (ref 37–54)
HEMOGLOBIN: 10.9 G/DL (ref 12.5–16.5)
MCH RBC QN AUTO: 28.5 PG (ref 26–35)
MCHC RBC AUTO-ENTMCNC: 31.8 % (ref 32–34.5)
MCV RBC AUTO: 89.8 FL (ref 80–99.9)
PDW BLD-RTO: 14.3 FL (ref 11.5–15)
PLATELET # BLD: 172 E9/L (ref 130–450)
PMV BLD AUTO: 11.4 FL (ref 7–12)
POTASSIUM SERPL-SCNC: 3.8 MMOL/L (ref 3.5–5)
PRO-BNP: ABNORMAL PG/ML (ref 0–450)
RBC # BLD: 3.82 E12/L (ref 3.8–5.8)
SODIUM BLD-SCNC: 139 MMOL/L (ref 132–146)
TROPONIN, HIGH SENSITIVITY: 58 NG/L (ref 0–11)
WBC # BLD: 7.2 E9/L (ref 4.5–11.5)

## 2021-12-22 PROCEDURE — 2500000003 HC RX 250 WO HCPCS: Performed by: STUDENT IN AN ORGANIZED HEALTH CARE EDUCATION/TRAINING PROGRAM

## 2021-12-22 PROCEDURE — 93005 ELECTROCARDIOGRAM TRACING: CPT | Performed by: PHYSICIAN ASSISTANT

## 2021-12-22 PROCEDURE — 85027 COMPLETE CBC AUTOMATED: CPT

## 2021-12-22 PROCEDURE — 84145 PROCALCITONIN (PCT): CPT

## 2021-12-22 PROCEDURE — 80048 BASIC METABOLIC PNL TOTAL CA: CPT

## 2021-12-22 PROCEDURE — 99284 EMERGENCY DEPT VISIT MOD MDM: CPT

## 2021-12-22 PROCEDURE — 36415 COLL VENOUS BLD VENIPUNCTURE: CPT

## 2021-12-22 PROCEDURE — 70450 CT HEAD/BRAIN W/O DYE: CPT

## 2021-12-22 PROCEDURE — 71045 X-RAY EXAM CHEST 1 VIEW: CPT

## 2021-12-22 PROCEDURE — 83880 ASSAY OF NATRIURETIC PEPTIDE: CPT

## 2021-12-22 PROCEDURE — 6360000002 HC RX W HCPCS: Performed by: STUDENT IN AN ORGANIZED HEALTH CARE EDUCATION/TRAINING PROGRAM

## 2021-12-22 PROCEDURE — 84484 ASSAY OF TROPONIN QUANT: CPT

## 2021-12-22 RX ORDER — SODIUM CHLORIDE 0.9 % (FLUSH) 0.9 %
10 SYRINGE (ML) INJECTION PRN
Status: DISCONTINUED | OUTPATIENT
Start: 2021-12-22 | End: 2021-12-27 | Stop reason: HOSPADM

## 2021-12-22 RX ORDER — HYDRALAZINE HYDROCHLORIDE 20 MG/ML
10 INJECTION INTRAMUSCULAR; INTRAVENOUS ONCE
Status: COMPLETED | OUTPATIENT
Start: 2021-12-22 | End: 2021-12-22

## 2021-12-22 RX ORDER — LABETALOL HYDROCHLORIDE 5 MG/ML
10 INJECTION, SOLUTION INTRAVENOUS ONCE
Status: COMPLETED | OUTPATIENT
Start: 2021-12-22 | End: 2021-12-22

## 2021-12-22 RX ADMIN — LABETALOL HYDROCHLORIDE 10 MG: 5 INJECTION INTRAVENOUS at 23:52

## 2021-12-22 RX ADMIN — HYDRALAZINE HYDROCHLORIDE 10 MG: 20 INJECTION INTRAMUSCULAR; INTRAVENOUS at 21:48

## 2021-12-22 NOTE — TELEPHONE ENCOUNTER
Spoke with Kayla Ac PTA and states pt. Is not on  hospice and does have physical therapy and home health coming to home. Brandy Query and patient that he needs to go to er. Pt stated he did take his bp meds today. Pt understood.

## 2021-12-22 NOTE — Clinical Note
Patient Class: Inpatient [101]   REQUIRED: Diagnosis: Hypertensive emergency [308991]   Estimated Length of Stay: Estimated stay of more than 2 midnights   Admitting Provider: Saturnino Capps [[id number]]   Telemetry/Cardiac Monitoring Required?: Yes

## 2021-12-22 NOTE — TELEPHONE ENCOUNTER
Physical therapist at [facility] called at 21 [phone] and left a message on my vm -stating that patient's bp was 190/84 at 4:00 and then after some activity was 226/98 at 4:20    Paddy Gallo  [phone]

## 2021-12-23 ENCOUNTER — APPOINTMENT (OUTPATIENT)
Dept: ULTRASOUND IMAGING | Age: 81
DRG: 304 | End: 2021-12-23
Payer: MEDICARE

## 2021-12-23 PROBLEM — I16.1 HYPERTENSIVE EMERGENCY: Status: ACTIVE | Noted: 2021-12-23

## 2021-12-23 LAB
ANION GAP SERPL CALCULATED.3IONS-SCNC: 12 MMOL/L (ref 7–16)
ANION GAP SERPL CALCULATED.3IONS-SCNC: 12 MMOL/L (ref 7–16)
BACTERIA: NORMAL /HPF
BASOPHILS ABSOLUTE: 0.07 E9/L (ref 0–0.2)
BASOPHILS RELATIVE PERCENT: 0.7 % (ref 0–2)
BILIRUBIN URINE: NEGATIVE
BLOOD, URINE: ABNORMAL
BUN BLDV-MCNC: 44 MG/DL (ref 6–23)
BUN BLDV-MCNC: 44 MG/DL (ref 6–23)
CALCIUM SERPL-MCNC: 8.5 MG/DL (ref 8.6–10.2)
CALCIUM SERPL-MCNC: 8.8 MG/DL (ref 8.6–10.2)
CHLORIDE BLD-SCNC: 103 MMOL/L (ref 98–107)
CHLORIDE BLD-SCNC: 106 MMOL/L (ref 98–107)
CHLORIDE URINE RANDOM: 75 MMOL/L
CHP ED QC CHECK: NORMAL
CHP ED QC CHECK: YES
CLARITY: CLEAR
CO2: 22 MMOL/L (ref 22–29)
CO2: 23 MMOL/L (ref 22–29)
COLOR: YELLOW
CREAT SERPL-MCNC: 3.1 MG/DL (ref 0.7–1.2)
CREAT SERPL-MCNC: 3.2 MG/DL (ref 0.7–1.2)
CREATININE URINE: 38 MG/DL (ref 40–278)
EKG ATRIAL RATE: 59 BPM
EKG P AXIS: 53 DEGREES
EKG P-R INTERVAL: 180 MS
EKG Q-T INTERVAL: 492 MS
EKG QRS DURATION: 154 MS
EKG QTC CALCULATION (BAZETT): 487 MS
EKG R AXIS: -72 DEGREES
EKG T AXIS: 56 DEGREES
EKG VENTRICULAR RATE: 59 BPM
EOSINOPHILS ABSOLUTE: 0.7 E9/L (ref 0.05–0.5)
EOSINOPHILS RELATIVE PERCENT: 7.4 % (ref 0–6)
EPITHELIAL CELLS, UA: NORMAL /HPF
GFR AFRICAN AMERICAN: 23
GFR AFRICAN AMERICAN: 23
GFR NON-AFRICAN AMERICAN: 19 ML/MIN/1.73
GFR NON-AFRICAN AMERICAN: 19 ML/MIN/1.73
GLUCOSE BLD-MCNC: 142 MG/DL
GLUCOSE BLD-MCNC: 157 MG/DL
GLUCOSE BLD-MCNC: 162 MG/DL (ref 74–99)
GLUCOSE BLD-MCNC: 204 MG/DL
GLUCOSE BLD-MCNC: 224 MG/DL
GLUCOSE BLD-MCNC: 224 MG/DL (ref 74–99)
GLUCOSE URINE: 250 MG/DL
HBA1C MFR BLD: 6.8 % (ref 4–5.6)
HCT VFR BLD CALC: 35.7 % (ref 37–54)
HEMOGLOBIN: 11.4 G/DL (ref 12.5–16.5)
IMMATURE GRANULOCYTES #: 0.03 E9/L
IMMATURE GRANULOCYTES %: 0.3 % (ref 0–5)
KETONES, URINE: NEGATIVE MG/DL
LEUKOCYTE ESTERASE, URINE: NEGATIVE
LYMPHOCYTES ABSOLUTE: 1.85 E9/L (ref 1.5–4)
LYMPHOCYTES RELATIVE PERCENT: 19.6 % (ref 20–42)
MAGNESIUM: 2.3 MG/DL (ref 1.6–2.6)
MCH RBC QN AUTO: 28.6 PG (ref 26–35)
MCHC RBC AUTO-ENTMCNC: 31.9 % (ref 32–34.5)
MCV RBC AUTO: 89.5 FL (ref 80–99.9)
METER GLUCOSE: 142 MG/DL (ref 74–99)
METER GLUCOSE: 157 MG/DL (ref 74–99)
METER GLUCOSE: 204 MG/DL (ref 74–99)
METER GLUCOSE: 224 MG/DL (ref 74–99)
MONOCYTES ABSOLUTE: 1.29 E9/L (ref 0.1–0.95)
MONOCYTES RELATIVE PERCENT: 13.7 % (ref 2–12)
NEUTROPHILS ABSOLUTE: 5.5 E9/L (ref 1.8–7.3)
NEUTROPHILS RELATIVE PERCENT: 58.3 % (ref 43–80)
NITRITE, URINE: NEGATIVE
PDW BLD-RTO: 14.3 FL (ref 11.5–15)
PH UA: 7 (ref 5–9)
PLATELET # BLD: 213 E9/L (ref 130–450)
PMV BLD AUTO: 10.9 FL (ref 7–12)
POTASSIUM REFLEX MAGNESIUM: 3.5 MMOL/L (ref 3.5–5)
POTASSIUM SERPL-SCNC: 3.3 MMOL/L (ref 3.5–5)
PROCALCITONIN: 0.18 NG/ML (ref 0–0.08)
PROCALCITONIN: 0.19 NG/ML (ref 0–0.08)
PROTEIN PROTEIN: 530 MG/DL (ref 0–12)
PROTEIN UA: >=300 MG/DL
RBC # BLD: 3.99 E12/L (ref 3.8–5.8)
RBC UA: NORMAL /HPF (ref 0–2)
SARS-COV-2, NAAT: DETECTED
SODIUM BLD-SCNC: 138 MMOL/L (ref 132–146)
SODIUM BLD-SCNC: 140 MMOL/L (ref 132–146)
SODIUM URINE: 86 MMOL/L
SPECIFIC GRAVITY UA: 1.02 (ref 1–1.03)
TROPONIN, HIGH SENSITIVITY: 58 NG/L (ref 0–11)
UROBILINOGEN, URINE: 0.2 E.U./DL
WBC # BLD: 9.4 E9/L (ref 4.5–11.5)
WBC UA: NORMAL /HPF (ref 0–5)

## 2021-12-23 PROCEDURE — 82570 ASSAY OF URINE CREATININE: CPT

## 2021-12-23 PROCEDURE — 36415 COLL VENOUS BLD VENIPUNCTURE: CPT

## 2021-12-23 PROCEDURE — 99223 1ST HOSP IP/OBS HIGH 75: CPT | Performed by: INTERNAL MEDICINE

## 2021-12-23 PROCEDURE — 2580000003 HC RX 258: Performed by: STUDENT IN AN ORGANIZED HEALTH CARE EDUCATION/TRAINING PROGRAM

## 2021-12-23 PROCEDURE — 6370000000 HC RX 637 (ALT 250 FOR IP): Performed by: INTERNAL MEDICINE

## 2021-12-23 PROCEDURE — 82962 GLUCOSE BLOOD TEST: CPT

## 2021-12-23 PROCEDURE — 81001 URINALYSIS AUTO W/SCOPE: CPT

## 2021-12-23 PROCEDURE — 84156 ASSAY OF PROTEIN URINE: CPT

## 2021-12-23 PROCEDURE — 6360000002 HC RX W HCPCS

## 2021-12-23 PROCEDURE — 2060000000 HC ICU INTERMEDIATE R&B

## 2021-12-23 PROCEDURE — 87635 SARS-COV-2 COVID-19 AMP PRB: CPT

## 2021-12-23 PROCEDURE — 80048 BASIC METABOLIC PNL TOTAL CA: CPT

## 2021-12-23 PROCEDURE — 83735 ASSAY OF MAGNESIUM: CPT

## 2021-12-23 PROCEDURE — 2500000003 HC RX 250 WO HCPCS: Performed by: STUDENT IN AN ORGANIZED HEALTH CARE EDUCATION/TRAINING PROGRAM

## 2021-12-23 PROCEDURE — 84145 PROCALCITONIN (PCT): CPT

## 2021-12-23 PROCEDURE — 93010 ELECTROCARDIOGRAM REPORT: CPT | Performed by: INTERNAL MEDICINE

## 2021-12-23 PROCEDURE — 2580000003 HC RX 258: Performed by: INTERNAL MEDICINE

## 2021-12-23 PROCEDURE — 76770 US EXAM ABDO BACK WALL COMP: CPT

## 2021-12-23 PROCEDURE — 6360000002 HC RX W HCPCS: Performed by: INTERNAL MEDICINE

## 2021-12-23 PROCEDURE — 93970 EXTREMITY STUDY: CPT

## 2021-12-23 PROCEDURE — 83036 HEMOGLOBIN GLYCOSYLATED A1C: CPT

## 2021-12-23 PROCEDURE — 82436 ASSAY OF URINE CHLORIDE: CPT

## 2021-12-23 PROCEDURE — 85025 COMPLETE CBC W/AUTO DIFF WBC: CPT

## 2021-12-23 PROCEDURE — 84300 ASSAY OF URINE SODIUM: CPT

## 2021-12-23 RX ORDER — AMLODIPINE BESYLATE 5 MG/1
10 TABLET ORAL DAILY
Status: DISCONTINUED | OUTPATIENT
Start: 2021-12-23 | End: 2021-12-23

## 2021-12-23 RX ORDER — SODIUM CHLORIDE 0.9 % (FLUSH) 0.9 %
10 SYRINGE (ML) INJECTION PRN
Status: DISCONTINUED | OUTPATIENT
Start: 2021-12-23 | End: 2021-12-27 | Stop reason: HOSPADM

## 2021-12-23 RX ORDER — FUROSEMIDE 10 MG/ML
20 INJECTION INTRAMUSCULAR; INTRAVENOUS 2 TIMES DAILY
Status: DISCONTINUED | OUTPATIENT
Start: 2021-12-23 | End: 2021-12-27 | Stop reason: HOSPADM

## 2021-12-23 RX ORDER — HYDROCODONE BITARTRATE AND ACETAMINOPHEN 5; 325 MG/1; MG/1
1 TABLET ORAL EVERY 6 HOURS PRN
COMMUNITY
End: 2021-12-23 | Stop reason: ALTCHOICE

## 2021-12-23 RX ORDER — CARVEDILOL 6.25 MG/1
6.25 TABLET ORAL 2 TIMES DAILY WITH MEALS
Status: DISCONTINUED | OUTPATIENT
Start: 2021-12-23 | End: 2021-12-24

## 2021-12-23 RX ORDER — HYDROCODONE BITARTRATE AND ACETAMINOPHEN 5; 325 MG/1; MG/1
1 TABLET ORAL EVERY 6 HOURS PRN
COMMUNITY
End: 2022-01-04 | Stop reason: SDUPTHER

## 2021-12-23 RX ORDER — INSULIN GLARGINE 100 [IU]/ML
18 INJECTION, SOLUTION SUBCUTANEOUS NIGHTLY
COMMUNITY

## 2021-12-23 RX ORDER — HYDRALAZINE HYDROCHLORIDE 50 MG/1
100 TABLET, FILM COATED ORAL EVERY 8 HOURS SCHEDULED
Status: DISCONTINUED | OUTPATIENT
Start: 2021-12-23 | End: 2021-12-27 | Stop reason: HOSPADM

## 2021-12-23 RX ORDER — SODIUM CHLORIDE 9 MG/ML
25 INJECTION, SOLUTION INTRAVENOUS PRN
Status: DISCONTINUED | OUTPATIENT
Start: 2021-12-23 | End: 2021-12-27 | Stop reason: HOSPADM

## 2021-12-23 RX ORDER — MORPHINE SULFATE 4 MG/ML
4 INJECTION, SOLUTION INTRAMUSCULAR; INTRAVENOUS ONCE
Status: COMPLETED | OUTPATIENT
Start: 2021-12-23 | End: 2021-12-23

## 2021-12-23 RX ORDER — FUROSEMIDE 20 MG/1
20 TABLET ORAL DAILY
COMMUNITY

## 2021-12-23 RX ORDER — TRAZODONE HYDROCHLORIDE 150 MG/1
150 TABLET ORAL NIGHTLY
COMMUNITY

## 2021-12-23 RX ORDER — LOSARTAN POTASSIUM 50 MG/1
100 TABLET ORAL DAILY
Status: DISCONTINUED | OUTPATIENT
Start: 2021-12-23 | End: 2021-12-27 | Stop reason: HOSPADM

## 2021-12-23 RX ORDER — DOXYCYCLINE HYCLATE 100 MG/1
100 CAPSULE ORAL EVERY 12 HOURS SCHEDULED
Status: DISCONTINUED | OUTPATIENT
Start: 2021-12-23 | End: 2021-12-27 | Stop reason: HOSPADM

## 2021-12-23 RX ORDER — DEXTROSE MONOHYDRATE 25 G/50ML
12.5 INJECTION, SOLUTION INTRAVENOUS PRN
Status: DISCONTINUED | OUTPATIENT
Start: 2021-12-23 | End: 2021-12-27 | Stop reason: HOSPADM

## 2021-12-23 RX ORDER — HYDRALAZINE HYDROCHLORIDE 25 MG/1
25 TABLET, FILM COATED ORAL 3 TIMES DAILY
Status: ON HOLD | COMMUNITY
End: 2021-12-27 | Stop reason: HOSPADM

## 2021-12-23 RX ORDER — ATORVASTATIN CALCIUM 20 MG/1
20 TABLET, FILM COATED ORAL DAILY
COMMUNITY
End: 2022-02-18 | Stop reason: SDUPTHER

## 2021-12-23 RX ORDER — ALBUTEROL SULFATE 2.5 MG/3ML
2.5 SOLUTION RESPIRATORY (INHALATION) ONCE
Status: COMPLETED | OUTPATIENT
Start: 2021-12-23 | End: 2021-12-23

## 2021-12-23 RX ORDER — ACETAMINOPHEN 325 MG/1
650 TABLET ORAL EVERY 6 HOURS PRN
Status: DISCONTINUED | OUTPATIENT
Start: 2021-12-23 | End: 2021-12-27 | Stop reason: HOSPADM

## 2021-12-23 RX ORDER — HYDRALAZINE HYDROCHLORIDE 50 MG/1
50 TABLET, FILM COATED ORAL EVERY 8 HOURS SCHEDULED
Status: DISCONTINUED | OUTPATIENT
Start: 2021-12-23 | End: 2021-12-23

## 2021-12-23 RX ORDER — HYDROCODONE BITARTRATE AND ACETAMINOPHEN 5; 325 MG/1; MG/1
1 TABLET ORAL EVERY 6 HOURS PRN
Status: DISCONTINUED | OUTPATIENT
Start: 2021-12-23 | End: 2021-12-27 | Stop reason: HOSPADM

## 2021-12-23 RX ORDER — SODIUM CHLORIDE 0.9 % (FLUSH) 0.9 %
10 SYRINGE (ML) INJECTION EVERY 12 HOURS SCHEDULED
Status: DISCONTINUED | OUTPATIENT
Start: 2021-12-23 | End: 2021-12-27 | Stop reason: HOSPADM

## 2021-12-23 RX ORDER — DEXTROSE MONOHYDRATE 50 MG/ML
100 INJECTION, SOLUTION INTRAVENOUS PRN
Status: DISCONTINUED | OUTPATIENT
Start: 2021-12-23 | End: 2021-12-27 | Stop reason: HOSPADM

## 2021-12-23 RX ORDER — POLYETHYLENE GLYCOL 3350 17 G/17G
17 POWDER, FOR SOLUTION ORAL DAILY PRN
Status: DISCONTINUED | OUTPATIENT
Start: 2021-12-23 | End: 2021-12-27 | Stop reason: HOSPADM

## 2021-12-23 RX ORDER — NICOTINE POLACRILEX 4 MG
15 LOZENGE BUCCAL PRN
Status: DISCONTINUED | OUTPATIENT
Start: 2021-12-23 | End: 2021-12-27 | Stop reason: HOSPADM

## 2021-12-23 RX ORDER — CEFDINIR 300 MG/1
300 CAPSULE ORAL DAILY
Status: DISCONTINUED | OUTPATIENT
Start: 2021-12-23 | End: 2021-12-27 | Stop reason: HOSPADM

## 2021-12-23 RX ORDER — ALBUTEROL SULFATE 2.5 MG/3ML
2.5 SOLUTION RESPIRATORY (INHALATION) EVERY 6 HOURS PRN
COMMUNITY

## 2021-12-23 RX ORDER — TRAMADOL HYDROCHLORIDE 50 MG/1
50 TABLET ORAL EVERY 8 HOURS PRN
Status: ON HOLD | COMMUNITY
End: 2021-12-27 | Stop reason: HOSPADM

## 2021-12-23 RX ORDER — HEPARIN SODIUM 10000 [USP'U]/ML
5000 INJECTION, SOLUTION INTRAVENOUS; SUBCUTANEOUS EVERY 8 HOURS SCHEDULED
Status: DISCONTINUED | OUTPATIENT
Start: 2021-12-24 | End: 2021-12-27 | Stop reason: HOSPADM

## 2021-12-23 RX ORDER — ACETAMINOPHEN 650 MG/1
650 SUPPOSITORY RECTAL EVERY 6 HOURS PRN
Status: DISCONTINUED | OUTPATIENT
Start: 2021-12-23 | End: 2021-12-27 | Stop reason: HOSPADM

## 2021-12-23 RX ORDER — TRAZODONE HYDROCHLORIDE 150 MG/1
150 TABLET ORAL NIGHTLY PRN
Status: DISCONTINUED | OUTPATIENT
Start: 2021-12-23 | End: 2021-12-27 | Stop reason: HOSPADM

## 2021-12-23 RX ORDER — GABAPENTIN 300 MG/1
300 CAPSULE ORAL NIGHTLY
COMMUNITY

## 2021-12-23 RX ORDER — ATORVASTATIN CALCIUM 40 MG/1
20 TABLET, FILM COATED ORAL NIGHTLY
Status: DISCONTINUED | OUTPATIENT
Start: 2021-12-23 | End: 2021-12-27 | Stop reason: HOSPADM

## 2021-12-23 RX ORDER — INSULIN GLARGINE 100 [IU]/ML
38 INJECTION, SOLUTION SUBCUTANEOUS NIGHTLY
Status: DISCONTINUED | OUTPATIENT
Start: 2021-12-23 | End: 2021-12-26

## 2021-12-23 RX ADMIN — DOXYCYCLINE HYCLATE 100 MG: 100 CAPSULE ORAL at 10:51

## 2021-12-23 RX ADMIN — DOXYCYCLINE HYCLATE 100 MG: 100 CAPSULE ORAL at 22:20

## 2021-12-23 RX ADMIN — HYDROCODONE BITARTRATE AND ACETAMINOPHEN 1 TABLET: 5; 325 TABLET ORAL at 23:49

## 2021-12-23 RX ADMIN — FUROSEMIDE 20 MG: 10 INJECTION, SOLUTION INTRAMUSCULAR; INTRAVENOUS at 17:37

## 2021-12-23 RX ADMIN — INSULIN LISPRO 2 UNITS: 100 INJECTION, SOLUTION INTRAVENOUS; SUBCUTANEOUS at 09:09

## 2021-12-23 RX ADMIN — FUROSEMIDE 20 MG: 10 INJECTION, SOLUTION INTRAMUSCULAR; INTRAVENOUS at 10:52

## 2021-12-23 RX ADMIN — HYDRALAZINE HYDROCHLORIDE 100 MG: 50 TABLET ORAL at 22:21

## 2021-12-23 RX ADMIN — LOSARTAN POTASSIUM 100 MG: 50 TABLET, FILM COATED ORAL at 11:24

## 2021-12-23 RX ADMIN — ATORVASTATIN CALCIUM 20 MG: 40 TABLET, FILM COATED ORAL at 22:20

## 2021-12-23 RX ADMIN — Medication 10 ML: at 23:49

## 2021-12-23 RX ADMIN — CARVEDILOL 6.25 MG: 6.25 TABLET, FILM COATED ORAL at 19:08

## 2021-12-23 RX ADMIN — ACETAMINOPHEN 650 MG: 325 TABLET ORAL at 11:26

## 2021-12-23 RX ADMIN — CEFDINIR 300 MG: 300 CAPSULE ORAL at 10:51

## 2021-12-23 RX ADMIN — HYDROCODONE BITARTRATE AND ACETAMINOPHEN 1 TABLET: 5; 325 TABLET ORAL at 16:40

## 2021-12-23 RX ADMIN — INSULIN GLARGINE 38 UNITS: 100 INJECTION, SOLUTION SUBCUTANEOUS at 21:46

## 2021-12-23 RX ADMIN — NICARDIPINE HYDROCHLORIDE 5 MG/HR: 25 INJECTION, SOLUTION INTRAVENOUS at 01:30

## 2021-12-23 RX ADMIN — INSULIN LISPRO 2 UNITS: 100 INJECTION, SOLUTION INTRAVENOUS; SUBCUTANEOUS at 16:47

## 2021-12-23 RX ADMIN — HYDRALAZINE HYDROCHLORIDE 50 MG: 50 TABLET, FILM COATED ORAL at 16:04

## 2021-12-23 RX ADMIN — MORPHINE SULFATE 4 MG: 4 INJECTION, SOLUTION INTRAMUSCULAR; INTRAVENOUS at 00:49

## 2021-12-23 RX ADMIN — ALBUTEROL SULFATE 2.5 MG: 2.5 SOLUTION RESPIRATORY (INHALATION) at 21:39

## 2021-12-23 RX ADMIN — TRAZODONE HYDROCHLORIDE 150 MG: 150 TABLET ORAL at 23:49

## 2021-12-23 RX ADMIN — AMLODIPINE BESYLATE 10 MG: 5 TABLET ORAL at 10:51

## 2021-12-23 RX ADMIN — INSULIN LISPRO 1 UNITS: 100 INJECTION, SOLUTION INTRAVENOUS; SUBCUTANEOUS at 12:58

## 2021-12-23 ASSESSMENT — PAIN SCALES - GENERAL
PAINLEVEL_OUTOF10: 9
PAINLEVEL_OUTOF10: 6
PAINLEVEL_OUTOF10: 10
PAINLEVEL_OUTOF10: 6
PAINLEVEL_OUTOF10: 7

## 2021-12-23 ASSESSMENT — ENCOUNTER SYMPTOMS
ABDOMINAL PAIN: 0
VOICE CHANGE: 0
BLOOD IN STOOL: 0
EYE REDNESS: 0
SORE THROAT: 0
DIARRHEA: 0
EYE DISCHARGE: 0
COUGH: 0
RHINORRHEA: 0
VOMITING: 0
BACK PAIN: 0
CHOKING: 0
TROUBLE SWALLOWING: 0
SHORTNESS OF BREATH: 0

## 2021-12-23 ASSESSMENT — PAIN DESCRIPTION - PAIN TYPE: TYPE: ACUTE PAIN

## 2021-12-23 ASSESSMENT — PAIN DESCRIPTION - ORIENTATION
ORIENTATION: LEFT

## 2021-12-23 ASSESSMENT — PAIN - FUNCTIONAL ASSESSMENT: PAIN_FUNCTIONAL_ASSESSMENT: PREVENTS OR INTERFERES WITH MANY ACTIVE NOT PASSIVE ACTIVITIES

## 2021-12-23 ASSESSMENT — PAIN DESCRIPTION - LOCATION
LOCATION: HIP

## 2021-12-23 ASSESSMENT — PAIN DESCRIPTION - DESCRIPTORS: DESCRIPTORS: DISCOMFORT;CONSTANT

## 2021-12-23 ASSESSMENT — PAIN DESCRIPTION - PROGRESSION: CLINICAL_PROGRESSION: NOT CHANGED

## 2021-12-23 ASSESSMENT — PAIN DESCRIPTION - FREQUENCY: FREQUENCY: CONTINUOUS

## 2021-12-23 ASSESSMENT — PAIN DESCRIPTION - ONSET: ONSET: ON-GOING

## 2021-12-23 NOTE — PROGRESS NOTES
PRN  dextrose, 100 mL/hr, PRN  sodium chloride flush, 10 mL, PRN  sodium chloride, 25 mL, PRN  polyethylene glycol, 17 g, Daily PRN  acetaminophen, 650 mg, Q6H PRN   Or  acetaminophen, 650 mg, Q6H PRN  sodium chloride flush, 10 mL, PRN         Objective:    BP (!) 155/70   Pulse 75   Temp 97.7 °F (36.5 °C)   Resp 16   Ht 6' (1.829 m)   Wt 235 lb (106.6 kg)   SpO2 93%   BMI 31.87 kg/m²     General Appearance: alert and oriented to person, place and time and in no acute distress  Skin: warm and dry  Head: normocephalic and atraumatic  Eyes: pupils equal, round, and reactive to light, extraocular eye movements intact, conjunctivae normal  Neck: neck supple and non tender without mass   Pulmonary/Chest: clear to auscultation bilaterally- no wheezes, rales or rhonchi, normal air movement, no respiratory distress  Cardiovascular: normal rate, normal S1 and S2 and no carotid bruits  Abdomen: soft, non-tender, non-distended, normal bowel sounds, no masses or organomegaly  Extremities: no cyanosis, no clubbing and trace edema  Neurologic: no cranial nerve deficit and speech normal        Recent Labs     12/22/21 2111 12/23/21  0556    140   K 3.8 3.5    106   CO2 24 22   BUN 44* 44*   CREATININE 3.1* 3.1*   GLUCOSE 171* 162*   CALCIUM 8.6 8.8       Recent Labs     12/22/21  2111 12/23/21  0556   WBC 7.2 9.4   RBC 3.82 3.99   HGB 10.9* 11.4*   HCT 34.3* 35.7*   MCV 89.8 89.5   MCH 28.5 28.6   MCHC 31.8* 31.9*   RDW 14.3 14.3    213   MPV 11.4 10.9         NOTE: This report was transcribed using voice recognition software. Every effort was made to ensure accuracy; however, inadvertent computerized transcription errors may be present.   Electronically signed by Chelsey Macario MD on 12/23/2021 at 8:43 AM

## 2021-12-23 NOTE — ED NOTES
Department of Emergency Medicine  FIRST PROVIDER TRIAGE NOTE             Independent MLP           12/22/21  7:03 PM EST    Date of Encounter: 12/22/21   MRN: [id number]      HPI: Sridhar Lawson is a 80 y.o. male who presents to the ED for Hypertension (Sent by PCP) and Blurred Vision   HTN, HA and blurry vision. ROS: Negative for cp or sob. PE: Gen Appearance/Constitutional: alert  CV: regular rate     Initial Plan of Care: All treatment areas with department are currently occupied. Plan to order/Initiate the following while awaiting opening in ED: labs, EKG and imaging studies.     Initial Plan of Care: Initiate Treatment-Testing, Proceed toTreatment Area When Bed Available for ED Attending/MLP to Continue Care    Electronically signed by CHANI Cronin   DD: 12/22/21         CHANI Mtz  12/22/21 0068

## 2021-12-23 NOTE — H&P
TUNNEL RELEASE Right 04/26/2017    JOINT REPLACEMENT Bilateral 1989    knees       Medications Prior to Admission:    Prior to Admission medications    Medication Sig Start Date End Date Taking? Authorizing Provider   albuterol (PROVENTIL) (2.5 MG/3ML) 0.083% nebulizer solution Take 3 mLs by nebulization every 6 hours as needed for Wheezing To use dos if needs 12/15/21   Hollis Daugherty MD   traZODone (DESYREL) 150 MG tablet take 1 tablet by mouth at bedtime 12/8/21   Hollis Daugherty MD   [facility] VERIO strip use 1 TEST STRIP to TEST BLOOD SUGAR four times a day 12/8/21   Hollis Daugherty MD   furosemide (LASIX) 20 MG tablet take 1 tablet by mouth once daily 12/8/21   Hollis Daugherty MD   [facility] VERIO strip use 1 TEST STRIP to TEST BLOOD SUGAR four times a day 12/8/21   Hollis Daugherty MD   losartan (COZAAR) 100 MG tablet Take 1 tablet by mouth daily 11/3/21   Hollis Daugherty MD   hydrALAZINE (APRESOLINE) 25 MG tablet take 1 tablet by mouth three times a day 10/6/21   Hollis Daugherty MD   Handicap Placard MISC by Does not apply route X 5 years 9/22/21   Hollis Daugherty MD   Insulin Pen Needle 32G X 4 MM MISC 1 each by Does not apply route 4 times daily 9/13/21 12/15/21  Hollis Daugherty MD   atorvastatin (LIPITOR) 20 MG tablet take 1 tablet by mouth once daily 9/9/21   Hollis Daugherty MD   gabapentin (NEURONTIN) 300 MG capsule take 1 capsule by mouth nightly 8/13/21 12/15/21  Hollis Daugherty MD   pramipexole (MIRAPEX) 0.125 MG tablet Take 1 tablet by mouth nightly 5/5/21 12/15/21  Hollis Daugherty MD   fluticasone-salmeterol (ADVAIR) 500-50 MCG/DOSE diskus inhaler Inhale 1 puff into the lungs every 12 hours To use dos and bring 5/5/21 12/15/21  Hollis Daugherty MD   insulin lispro, 1 Unit Dial, 100 UNIT/ML SOPN Pt uses this on sliding scale  Tid.  Breakfast, lunch and dinner Above 180-200 = 2 u 201-230=4 u 231-280=6u 281-300=8u 301-350=10u 350-400=12u >400 call doctor 1/22/21 Jayjay Velásquez MD   amLODIPine (NORVASC) 10 MG tablet Take 1 tablet by mouth daily 1/20/21 12/15/21  Jayjay Velásquez MD   insulin glargine (LANTUS SOLOSTAR) 100 UNIT/ML injection pen Inject 38 Units into the skin nightly 1/20/21 12/15/21  Jayjay Velásquez MD   potassium chloride (KLOR-CON M) 20 MEQ extended release tablet Take 1 tablet by mouth 2 times daily 1/20/21 12/15/21  Jayjay Velásquez MD   Handicap Placard MISC by Does not apply route X 5 years 9/16/20 9/22/21  Jayjay Velásquez MD       Allergies:    Patient has no known allergies. Social History:    reports that he quit smoking about 52 years ago. He started smoking about 61 years ago. He has a 9.00 pack-year smoking history. His smokeless tobacco use includes snuff. He reports that he does not drink alcohol and does not use drugs. Family History:   Noncontributory    PHYSICAL EXAM:  Vitals:  BP (!) 234/89   Pulse 62   Temp 97.7 °F (36.5 °C)   Resp 16   Ht 6' (1.829 m)   Wt 235 lb (106.6 kg)   SpO2 96%   BMI 31.87 kg/m²   General Appearance: alert and oriented to person, place and time and in no acute distress  Skin: warm and dry, turgor not diminished  Head: normocephalic and atraumatic  Eyes: pupils equal, round, and reactive to light, extraocular eye movements intact, conjunctivae normal  Neck: neck supple and non tender without mass   Pulmonary/Chest: clear to auscultation bilaterally- no wheezes, rales or rhonchi, normal air movement, no respiratory distress  Cardiovascular: normal rate, normal S1 and S2 and no M/R/R  Abdomen: soft, non-tender, non-distended, normal bowel sounds, no masses or organomegaly  Extremities: no cyanosis, no clubbing and no edema.   Has tenderness over his left posterior gluteal region  Neurologic: no cranial nerve deficit and speech normal        LABS:  Recent Labs     12/22/21  2111      K 3.8      CO2 24   BUN 44*   CREATININE 3.1*   GLUCOSE 171*   CALCIUM 8.6       Recent Labs 12/22/21  2111   WBC 7.2   RBC 3.82   HGB 10.9*   HCT 34.3*   MCV 89.8   MCH 28.5   MCHC 31.8*   RDW 14.3      MPV 11.4       No results for input(s): POCGLU in the last 72 hours. Radiology:   XR CHEST PORTABLE   Final Result   Patchy opacities in the right lung, concerning for developing infiltrates   from pneumonia. Scattered areas of scarring and/or atelectasis in the lung bases. Coarsened interstitial opacities which could be chronic. Superimposition of   acute process including interstitial edema or other atypical/viral infection   also considered. Continued follow-up recommended. CT HEAD WO CONTRAST   Final Result   No intracranial hemorrhage. Mucosal thickening in the sinuses may represent sinusitis. RECOMMENDATIONS:   Unavailable             EKG: Pending    ASSESSMENT/PLAN:  Accelerated hypertension  Discussed with the ED team.  Continue measures to drop blood pressure. If unsuccessful may need nicardipine drip and transfer to ICU.  Continue losartan but may need to hold if kidney function worsens. Increase hydralazine to 50 3 times daily. Continue Norvasc, may need to switch to nifedipine and titrate upwards if persistent elevated blood pressure    Community-acquired pneumonia  Has a cough and infiltrate on the x-ray. Check procalcitonin. Empiric antibiotics for now. Covid test.    Chronic diastolic heart failure  Was on Lasix recently. Leading to increased urination. Trial IV Lasix 20 twice daily    Acute on CKD 3  Baseline creatinine around 2.2  Monitor with diuresis. [facility] nephrology    Diabetes mellitus type 2  Basal/bolus with SSI    DVT prophylaxis: Heparin      NOTE: This report was transcribed using voice recognition software. Every effort was made to ensure accuracy; however, inadvertent computerized transcription errors may be present.   Electronically signed by Stevo Rothman MD on 12/23/2021 at 12:05 AM

## 2021-12-23 NOTE — ED NOTES
Assumed care of patient at 1100. Found to be alert and oriented. Nicardipine drip infusing as noted. Complains of hip pain, given PRN tylenol. NSR noted on telemetry. No additional needs at this time. Continue to await inpatient bed assignment.         Claire Gaona RN  12/23/21 7127

## 2021-12-23 NOTE — ED PROVIDER NOTES
49-year-old male presenting to emergency department with complaints of elevated blood pressure and blurred vision. Patient was at therapy, when he was noted to have elevated blood pressure. Patient sent in for further evaluation. Blood pressure on triage 228/92. Patient reports that he feels largely asymptomatic. He does have episodes of blurred vision which has been going on for about 1 week. Patient also reports some dizziness and headaches, denies one currently. Symptoms onset 2 weeks ago, episodic, nothing improves, nothing worsens, no associated symptoms. Patient states that he has been taking his blood pressure medications as prescribed. Patient notes that he recently had a fall, which she was evaluated for. Review of Systems   Constitutional: Negative for chills and fever. HENT: Negative for congestion, rhinorrhea, sore throat, trouble swallowing and voice change. Eyes: Positive for visual disturbance. Negative for discharge and redness. Respiratory: Negative for cough, choking and shortness of breath. Cardiovascular: Positive for leg swelling. Negative for chest pain and palpitations. Gastrointestinal: Negative for abdominal pain, blood in stool, diarrhea and vomiting. Genitourinary: Negative for flank pain and urgency. Musculoskeletal: Positive for myalgias. Negative for back pain and neck pain. Skin: Negative for rash and wound. Neurological: Positive for dizziness and headaches. Negative for syncope and numbness. Psychiatric/Behavioral: Negative for behavioral problems and confusion. Physical Exam  Vitals reviewed. Constitutional:       General: He is not in acute distress. HENT:      Head: Normocephalic. Right Ear: External ear normal.      Left Ear: External ear normal.      Nose: Nose normal.      Mouth/Throat:      Pharynx: Oropharynx is clear. No oropharyngeal exudate or posterior oropharyngeal erythema.    Eyes:      General:         Right eye: No discharge. Left eye: No discharge. Conjunctiva/sclera: Conjunctivae normal.      Pupils: Pupils are equal, round, and reactive to light. Cardiovascular:      Rate and Rhythm: Normal rate and regular rhythm. Pulses: Normal pulses. Heart sounds: No murmur heard. Pulmonary:      Effort: Pulmonary effort is normal.      Breath sounds: No rhonchi. Chest:      Chest wall: No tenderness. Abdominal:      General: Abdomen is flat. Palpations: Abdomen is soft. Tenderness: There is no abdominal tenderness. There is no right CVA tenderness, left CVA tenderness, guarding or rebound. Musculoskeletal:         General: No tenderness. Cervical back: Normal range of motion and neck supple. No rigidity or tenderness. Right lower leg: Edema present. Left lower leg: Edema present. Skin:     General: Skin is warm. Findings: No erythema or rash. Neurological:      Mental Status: He is alert and oriented to person, place, and time. Motor: No weakness. Psychiatric:         Mood and Affect: Mood normal.         Behavior: Behavior normal.          Procedures     MDM  Number of Diagnoses or Management Options  Acute congestive heart failure, unspecified heart failure type ([facility] [address]  Acute renal failure, unspecified acute renal failure type [facility])  Hypertensive emergency  Diagnosis management comments: 80-year-old male presenting to the emergency department with elevated blood pressure, sent in from therapy. Initial triage blood pressure 228/92. Patient started on hydralazine 10 mg. Patient positive for Covid, with elevation in BNP troponin and elevation in BUN and creatinine Initial improvement but noted to return to previous. Spoke to hospitalist, recommended that patient's blood pressure may require him to be in ICU. Patient started on labetalol 10 mg with pressure minimally decreasing.   Patient started nicardipine drip, and given morphine for pain left hip from previous fall a week ago. chest x-ray shows patchy opacities in the right lung with concerns for pneumonia with scattered areas of atelectasis in bases and coarse interstitial opacities CT of the head showed no acute intercranial hemorrhage. Dr. Krista Connors spoke to Dr. Dorian Shah ICU, who will accept for admission. Amount and/or Complexity of Data Reviewed  Decide to obtain previous medical records or to obtain history from someone other than the patient: yes         ED Course as of 12/23/21 0435 Thu Dec 23, 2021   0131 Consulted with Dr. Lora Wetzel, hospitalist, who accepted for admission. [KG]   0150 Consulted with Dr. Dorian Shah, ICU, who accepted for admission. [KG]      ED Course User Index  [KG] Dawna Ren DO        ED Course as of 12/23/21 0435 Thu Dec 23, 2021   0131 Consulted with Dr. Lora Wetzel, hospitalist, who accepted for admission. [KG]   0150 Consulted with Dr. Dorian Shah, ICU, who accepted for admission. [KG]      ED Course User Index  [KG] Dawna Ren DO       --------------------------------------------- PAST HISTORY ---------------------------------------------  Past Medical History:  has a past medical history of Bronchitis, Carpal tunnel syndrome, right, COPD (chronic obstructive pulmonary disease) ([facility] [address] Diabetes ([facility] [address] Foot drop, left, Hyperlipidemia, Hypertension, and Neuropathy. Past Surgical History:  has a past surgical history that includes Appendectomy; back surgery (2012); joint replacement (Bilateral, 1989); and Carpal tunnel release (Right, 04/26/2017). Social History:  reports that he quit smoking about 52 years ago. He started smoking about 61 years ago. He has a 9.00 pack-year smoking history. His smokeless tobacco use includes snuff. He reports that he does not drink alcohol and does not use drugs. Family History: family history is not on file. The patients home medications have been reviewed.     Allergies: Patient has no known atypical/viral infection   also considered. Continued follow-up recommended. CT HEAD WO CONTRAST   Final Result   No intracranial hemorrhage. Mucosal thickening in the sinuses may represent sinusitis. RECOMMENDATIONS:   Unavailable             EKG:  This EKG is signed and interpreted by me. Rate: 59  Rhythm: Sinus  Interpretation: sinus bradycardia  Comparison: Stable      ------------------------- NURSING NOTES AND VITALS REVIEWED ---------------------------  Date / Time Roomed:  12/22/2021  7:40 PM  ED Bed Assignment:  20/20    The nursing notes within the ED encounter and vital signs as below have been reviewed. Patient Vitals for the past 24 hrs:   BP Temp Pulse Resp SpO2 Height Weight   12/23/21 0315 (!) 169/71  66  92 %     12/23/21 0300 (!) 164/70  68  92 %     12/23/21 0245 (!) 203/81  65  93 %     12/23/21 0230 (!) 196/81  64  94 %     12/23/21 0215 (!) 214/97  67  94 %     12/23/21 0200 (!) 230/96  59  94 %     12/23/21 0145 (!) 240/112  59  95 %     12/23/21 0045 (!) 231/91         12/22/21 2345 (!) 234/89         12/22/21 2214 (!) 210/98         12/22/21 2200 (!) 223/102         12/22/21 2145 (!) 233/109  62       12/22/21 2115 (!) 198/99         12/22/21 2100 (!) 205/105         12/22/21 1902 (!) 228/92 97.7 °F (36.5 °C) 61 16 96 % 6' (1.829 m) 235 lb (106.6 kg)       Oxygen Saturation Interpretation: Normal    ------------------------------------------ PROGRESS NOTES ------------------------------------------  Re-evaluation(s):    Patients symptoms show no change  Repeat physical examination is not changed    Counseling:  I have spoken with the patient and discussed todays results, in addition to providing specific details for the plan of care and counseling regarding the diagnosis and prognosis.   Their questions are answered at this time and they are agreeable with the plan of admission.    --------------------------------- ADDITIONAL PROVIDER NOTES ---------------------------------  Consultations:  Spoke with Dr. Aretha Spears and Dr Hellen Bueno. Discussed case. They will admit the patient. This patient's ED course included: a personal history and physicial examination, re-evaluation prior to disposition, multiple bedside re-evaluations, IV medications, cardiac monitoring and continuous pulse oximetry    This patient has remained hemodynamically stable during their ED course. Diagnosis:  1. Hypertensive emergency    2. Acute renal failure, unspecified acute renal failure type ([facility] [address]    3. Acute congestive heart failure, unspecified heart failure type ([facility] [address]        Disposition:  Patient's disposition: Admit to CCU/ICU  Patient's condition is stable.          Keenan Darling DO  Resident  12/23/21 7135

## 2021-12-24 LAB
ANION GAP SERPL CALCULATED.3IONS-SCNC: 13 MMOL/L (ref 7–16)
BACTERIA: ABNORMAL /HPF
BASOPHILS ABSOLUTE: 0.03 E9/L (ref 0–0.2)
BASOPHILS RELATIVE PERCENT: 0.4 % (ref 0–2)
BILIRUBIN URINE: NEGATIVE
BLOOD, URINE: NEGATIVE
BUN BLDV-MCNC: 46 MG/DL (ref 6–23)
CALCIUM SERPL-MCNC: 8.4 MG/DL (ref 8.6–10.2)
CHLORIDE BLD-SCNC: 102 MMOL/L (ref 98–107)
CLARITY: CLEAR
CO2: 21 MMOL/L (ref 22–29)
COLOR: YELLOW
CREAT SERPL-MCNC: 3.3 MG/DL (ref 0.7–1.2)
EOSINOPHILS ABSOLUTE: 0.56 E9/L (ref 0.05–0.5)
EOSINOPHILS RELATIVE PERCENT: 6.9 % (ref 0–6)
GFR AFRICAN AMERICAN: 22
GFR NON-AFRICAN AMERICAN: 18 ML/MIN/1.73
GLUCOSE BLD-MCNC: 117 MG/DL (ref 74–99)
GLUCOSE URINE: 100 MG/DL
HCT VFR BLD CALC: 33 % (ref 37–54)
HEMOGLOBIN: 9.9 G/DL (ref 12.5–16.5)
IMMATURE GRANULOCYTES #: 0.01 E9/L
IMMATURE GRANULOCYTES %: 0.1 % (ref 0–5)
KETONES, URINE: NEGATIVE MG/DL
LEUKOCYTE ESTERASE, URINE: NEGATIVE
LYMPHOCYTES ABSOLUTE: 1.08 E9/L (ref 1.5–4)
LYMPHOCYTES RELATIVE PERCENT: 13.3 % (ref 20–42)
MAGNESIUM: 2.3 MG/DL (ref 1.6–2.6)
MCH RBC QN AUTO: 28.9 PG (ref 26–35)
MCHC RBC AUTO-ENTMCNC: 30 % (ref 32–34.5)
MCV RBC AUTO: 96.2 FL (ref 80–99.9)
METER GLUCOSE: 103 MG/DL (ref 74–99)
METER GLUCOSE: 122 MG/DL (ref 74–99)
METER GLUCOSE: 133 MG/DL (ref 74–99)
METER GLUCOSE: 90 MG/DL (ref 74–99)
MONOCYTES ABSOLUTE: 1.1 E9/L (ref 0.1–0.95)
MONOCYTES RELATIVE PERCENT: 13.5 % (ref 2–12)
NEUTROPHILS ABSOLUTE: 5.37 E9/L (ref 1.8–7.3)
NEUTROPHILS RELATIVE PERCENT: 65.8 % (ref 43–80)
NITRITE, URINE: NEGATIVE
PDW BLD-RTO: 14.6 FL (ref 11.5–15)
PH UA: 6 (ref 5–9)
PLATELET # BLD: 174 E9/L (ref 130–450)
PMV BLD AUTO: 11 FL (ref 7–12)
POTASSIUM REFLEX MAGNESIUM: 3.3 MMOL/L (ref 3.5–5)
PROTEIN UA: 100 MG/DL
RBC # BLD: 3.43 E12/L (ref 3.8–5.8)
RBC UA: ABNORMAL /HPF (ref 0–2)
SODIUM BLD-SCNC: 136 MMOL/L (ref 132–146)
SPECIFIC GRAVITY UA: 1.02 (ref 1–1.03)
UROBILINOGEN, URINE: 0.2 E.U./DL
WBC # BLD: 8.2 E9/L (ref 4.5–11.5)
WBC UA: ABNORMAL /HPF (ref 0–5)

## 2021-12-24 PROCEDURE — 82962 GLUCOSE BLOOD TEST: CPT

## 2021-12-24 PROCEDURE — 6370000000 HC RX 637 (ALT 250 FOR IP): Performed by: INTERNAL MEDICINE

## 2021-12-24 PROCEDURE — 88112 CYTOPATH CELL ENHANCE TECH: CPT

## 2021-12-24 PROCEDURE — 83735 ASSAY OF MAGNESIUM: CPT

## 2021-12-24 PROCEDURE — 6360000002 HC RX W HCPCS: Performed by: INTERNAL MEDICINE

## 2021-12-24 PROCEDURE — 85025 COMPLETE CBC W/AUTO DIFF WBC: CPT

## 2021-12-24 PROCEDURE — 81001 URINALYSIS AUTO W/SCOPE: CPT

## 2021-12-24 PROCEDURE — 2580000003 HC RX 258: Performed by: INTERNAL MEDICINE

## 2021-12-24 PROCEDURE — 2060000000 HC ICU INTERMEDIATE R&B

## 2021-12-24 PROCEDURE — 99233 SBSQ HOSP IP/OBS HIGH 50: CPT | Performed by: INTERNAL MEDICINE

## 2021-12-24 PROCEDURE — 80048 BASIC METABOLIC PNL TOTAL CA: CPT

## 2021-12-24 PROCEDURE — 36415 COLL VENOUS BLD VENIPUNCTURE: CPT

## 2021-12-24 RX ORDER — CARVEDILOL 6.25 MG/1
6.25 TABLET ORAL ONCE
Status: COMPLETED | OUTPATIENT
Start: 2021-12-24 | End: 2021-12-24

## 2021-12-24 RX ORDER — ALBUTEROL SULFATE 90 UG/1
2 AEROSOL, METERED RESPIRATORY (INHALATION) EVERY 4 HOURS PRN
Status: DISCONTINUED | OUTPATIENT
Start: 2021-12-24 | End: 2021-12-27 | Stop reason: HOSPADM

## 2021-12-24 RX ORDER — POTASSIUM CHLORIDE 20 MEQ/1
40 TABLET, EXTENDED RELEASE ORAL ONCE
Status: COMPLETED | OUTPATIENT
Start: 2021-12-24 | End: 2021-12-24

## 2021-12-24 RX ORDER — BUDESONIDE AND FORMOTEROL FUMARATE DIHYDRATE 160; 4.5 UG/1; UG/1
2 AEROSOL RESPIRATORY (INHALATION) 2 TIMES DAILY
Status: DISCONTINUED | OUTPATIENT
Start: 2021-12-24 | End: 2021-12-27 | Stop reason: HOSPADM

## 2021-12-24 RX ORDER — CARVEDILOL 6.25 MG/1
12.5 TABLET ORAL 2 TIMES DAILY WITH MEALS
Status: DISCONTINUED | OUTPATIENT
Start: 2021-12-24 | End: 2021-12-24

## 2021-12-24 RX ORDER — CARVEDILOL 25 MG/1
25 TABLET ORAL 2 TIMES DAILY WITH MEALS
Status: DISCONTINUED | OUTPATIENT
Start: 2021-12-24 | End: 2021-12-27 | Stop reason: HOSPADM

## 2021-12-24 RX ADMIN — INSULIN GLARGINE 38 UNITS: 100 INJECTION, SOLUTION SUBCUTANEOUS at 22:22

## 2021-12-24 RX ADMIN — CARVEDILOL 6.25 MG: 6.25 TABLET, FILM COATED ORAL at 11:23

## 2021-12-24 RX ADMIN — CARVEDILOL 25 MG: 25 TABLET, FILM COATED ORAL at 17:30

## 2021-12-24 RX ADMIN — FUROSEMIDE 20 MG: 10 INJECTION, SOLUTION INTRAMUSCULAR; INTRAVENOUS at 17:30

## 2021-12-24 RX ADMIN — POTASSIUM CHLORIDE 40 MEQ: 1500 TABLET, EXTENDED RELEASE ORAL at 13:56

## 2021-12-24 RX ADMIN — POLYETHYLENE GLYCOL 3350 17 G: 17 POWDER, FOR SOLUTION ORAL at 15:24

## 2021-12-24 RX ADMIN — DOXYCYCLINE HYCLATE 100 MG: 100 CAPSULE ORAL at 22:11

## 2021-12-24 RX ADMIN — HYDROCODONE BITARTRATE AND ACETAMINOPHEN 1 TABLET: 5; 325 TABLET ORAL at 14:21

## 2021-12-24 RX ADMIN — HYDROCODONE BITARTRATE AND ACETAMINOPHEN 1 TABLET: 5; 325 TABLET ORAL at 22:11

## 2021-12-24 RX ADMIN — HYDRALAZINE HYDROCHLORIDE 100 MG: 50 TABLET ORAL at 05:55

## 2021-12-24 RX ADMIN — ALBUTEROL SULFATE 2 PUFF: 90 AEROSOL, METERED RESPIRATORY (INHALATION) at 22:29

## 2021-12-24 RX ADMIN — HYDROCODONE BITARTRATE AND ACETAMINOPHEN 1 TABLET: 5; 325 TABLET ORAL at 05:55

## 2021-12-24 RX ADMIN — HYDRALAZINE HYDROCHLORIDE 100 MG: 50 TABLET ORAL at 14:16

## 2021-12-24 RX ADMIN — Medication 10 ML: at 09:43

## 2021-12-24 RX ADMIN — LOSARTAN POTASSIUM 100 MG: 50 TABLET, FILM COATED ORAL at 09:42

## 2021-12-24 RX ADMIN — ALBUTEROL SULFATE 2 PUFF: 90 AEROSOL, METERED RESPIRATORY (INHALATION) at 17:23

## 2021-12-24 RX ADMIN — FUROSEMIDE 20 MG: 10 INJECTION, SOLUTION INTRAMUSCULAR; INTRAVENOUS at 09:43

## 2021-12-24 RX ADMIN — HEPARIN SODIUM 5000 UNITS: 10000 INJECTION INTRAVENOUS; SUBCUTANEOUS at 14:16

## 2021-12-24 RX ADMIN — DOXYCYCLINE HYCLATE 100 MG: 100 CAPSULE ORAL at 09:42

## 2021-12-24 RX ADMIN — HYDRALAZINE HYDROCHLORIDE 100 MG: 50 TABLET ORAL at 22:11

## 2021-12-24 RX ADMIN — HEPARIN SODIUM 5000 UNITS: 10000 INJECTION INTRAVENOUS; SUBCUTANEOUS at 22:22

## 2021-12-24 RX ADMIN — CEFDINIR 300 MG: 300 CAPSULE ORAL at 09:43

## 2021-12-24 RX ADMIN — ATORVASTATIN CALCIUM 20 MG: 40 TABLET, FILM COATED ORAL at 22:11

## 2021-12-24 RX ADMIN — BUDESONIDE AND FORMOTEROL FUMARATE DIHYDRATE 2 PUFF: 160; 4.5 AEROSOL RESPIRATORY (INHALATION) at 17:24

## 2021-12-24 RX ADMIN — Medication 10 ML: at 22:16

## 2021-12-24 RX ADMIN — CARVEDILOL 6.25 MG: 6.25 TABLET, FILM COATED ORAL at 09:42

## 2021-12-24 RX ADMIN — HEPARIN SODIUM 5000 UNITS: 10000 INJECTION INTRAVENOUS; SUBCUTANEOUS at 06:03

## 2021-12-24 RX ADMIN — ACETAMINOPHEN 650 MG: 325 TABLET ORAL at 17:30

## 2021-12-24 ASSESSMENT — PAIN SCALES - GENERAL
PAINLEVEL_OUTOF10: 7
PAINLEVEL_OUTOF10: 8
PAINLEVEL_OUTOF10: 3
PAINLEVEL_OUTOF10: 6
PAINLEVEL_OUTOF10: 0
PAINLEVEL_OUTOF10: 0

## 2021-12-24 NOTE — PROGRESS NOTES
Pulmonary/Critical Care to sign off at this time. Please let us know if our services are needed any further.     Christopher Sloan MD

## 2021-12-24 NOTE — PROGRESS NOTES
[facility] MD Enrico German MD. Milena Alegria MD  Progress Note    12/24/2021    SUBJECTIVE:   Off nicardipine drip   BP still on higher side   Cr relatively stable   On RA  D/w RN on floor     PROBLEM LIST:    Active Problems:    Hypertensive emergency  Resolved Problems:    * No resolved hospital problems. *       DIET:    ADULT DIET; Regular; No Added Salt (3-4 gm)       Allergies : Patient has no known allergies. Past Medical History:   Diagnosis Date    Bronchitis     recent, abx completed, has minimal cough at this time    Carpal tunnel syndrome, right     COPD (chronic obstructive pulmonary disease) (HCC)     Diabetes ([facility] [address]     Foot drop, left     dt nerve damage    Hyperlipidemia     Hypertension     Neuropathy     left leg       Past Surgical History:   Procedure Laterality Date    APPENDECTOMY      BACK SURGERY  2012    lumbar    CARPAL TUNNEL RELEASE Right 04/26/2017    JOINT REPLACEMENT Bilateral 1989    knees       No family history on file. reports that he quit smoking about 52 years ago. He started smoking about 61 years ago. He has a 9.00 pack-year smoking history. His smokeless tobacco use includes snuff. He reports that he does not drink alcohol and does not use drugs. Review of Systems:   Constitutional: no fevers , no chills , feels ok   Eyes: no eye pain , no itching , no drainage  Ears, nose, mouth, throat, and face: no ear ,nose pain , hearing is ok ,no nasal drainage   Respiratory: no sob ,no cough ,no wheezing . Cardiovascular: no chest pain , no palpitation ,no sob . Gastrointestinal: no nausea, vomiting , constipation , no abdominal pain . Genitourinary:no urinary retention , no burning , dysuria . No polyuria   Hematologic/lymphatic: no bleeding , no cougulation issues . Musculoskeletal:no joint pain , no swelling . Neurological: no headaches ,no weakness , no numbness .    Endocrine: no thirst , no weight issues .      MEDS (scheduled):    carvedilol  25 mg Oral BID WC    atorvastatin  20 mg Oral Nightly    insulin glargine  38 Units SubCUTAneous Nightly    losartan  100 mg Oral Daily    furosemide  20 mg IntraVENous BID    sodium chloride flush  10 mL IntraVENous 2 times per day    heparin (porcine)  5,000 Units SubCUTAneous 3 times per day    insulin lispro  0-6 Units SubCUTAneous TID WC    insulin lispro  0-3 Units SubCUTAneous Nightly    cefdinir  300 mg Oral Daily    doxycycline hyclate  100 mg Oral 2 times per day    hydrALAZINE  100 mg Oral 3 times per day       MEDS (infusions):   dextrose      sodium chloride      niCARdipene (CARDENE) 50 mg in 250 mL 0.9 % sodium chloride infusion Stopped (12/23/21 1833)       MEDS (prn):  traZODone, glucose, dextrose, glucagon (rDNA), dextrose, sodium chloride flush, sodium chloride, polyethylene glycol, acetaminophen **OR** acetaminophen, HYDROcodone 5 mg - acetaminophen, sodium chloride flush    PHYSICAL EXAM:     Patient Vitals for the past 24 hrs:   BP Temp Temp src Pulse Resp SpO2 Weight   12/24/21 1400 (!) 169/76 98 °F (36.7 °C) Oral 79 20 96 %    12/24/21 0930 (!) 150/64 98.9 °F (37.2 °C) Oral 82 20 95 %    12/24/21 0556 (!) 148/66         12/24/21 0317       227 lb 11.2 oz (103.3 kg)   12/23/21 2348 (!) 176/80 98.1 °F (36.7 °C) Oral 80 20 97 %    12/23/21 2218 (!) 176/87 97.9 °F (36.6 °C)  74 18 95 %    12/23/21 2020 (!) 167/76   76      12/23/21 1938 (!) 168/74   76 18     12/23/21 1901 (!) 164/75   76 14     12/23/21 1832 (!) 162/69   74 14     12/23/21 1820 (!) 165/79   74 18     12/23/21 1805 (!) 169/68   79 20 93 %    12/23/21 1745 (!) 156/67   72      12/23/21 1715 (!) 150/80   74 16 94 %    12/23/21 1515 (!) 166/76   78 20 97 %    @      Intake/Output Summary (Last 24 hours) at 12/24/2021 1507  Last data filed at 12/24/2021 0315  Gross per 24 hour   Intake    Output 880 ml   Net -880 ml         Wt Readings from Last 3 Encounters:   12/24/21 227 lb 11.2 oz (103.3 kg)   12/15/21 240 lb (108.9 kg)   12/12/21 236 lb (107 kg)       Constitutional:  in no acute distress  Oral: mucus membranes moist  Neck: no JVD  Cardiovascular: S1, S2 regular rhythm, no murmur,or rub  Respiratory:  No crackles, no wheeze  Gastrointestinal:  Soft, nontender, nondistended, NABS  Ext: no edema, feet warm  Skin: dry, no rash  Neuro: awake, alert, interactive      DATA:    Recent Labs     12/22/21  2111 12/23/21  0556 12/24/21  0400   WBC 7.2 9.4 8.2   HGB 10.9* 11.4* 9.9*   HCT 34.3* 35.7* 33.0*   MCV 89.8 89.5 96.2    213 174     Recent Labs     12/23/21  0556 12/23/21  1732 12/24/21  0400    138 136   K 3.5 3.3* 3.3*    103 102   CO2 22 23 21*   MG 2.3  --  2.3   BUN 44* 44* 46*   CREATININE 3.1* 3.2* 3.3*       No results found for: LABPROT    ASSESSMENT / RECOMMENDATIONS:      1. Acute kidney injury. The patient's acute kidney injury is likely related to his  uncontrolled hypertension, which likely has been going on at least for  the last few weeks supported by clinical history of a blurry vision and  feeling tired for few weeks. 2.  Chronic kidney disease, stage IV. he is noticed to have nephrotic-range proteinuria, however, would  like to do workup and repeat proteinuria studies once the blood pressure  is under good control. If he continued to have nephrotic-range  proteinuria following having blood pressure under good control, then  this can be worked up, however, the _____ this is most likely related to  underlying diabetic nephropathy. 3.  COVID-19 positive. 4.  Hypertension urgency, currently on nicardipine drip. 5.  History of diastolic heart failure, compensated.     PLAN:     Cr relatively stable   Increase coreg to 25 mg bid / continue hydralzine , losartan , amlodipine .    With better BP control his cr might jump up   Bmp in am   Electronically signed by Rodrick Lal MD on 12/24/2021 at 3:07 PM

## 2021-12-24 NOTE — PROGRESS NOTES
[facility] Progress Note    Admitting Date and Time: 12/22/2021  7:40 PM  Admit Dx: Hypertensive emergency [I16.1]  Acute renal failure, unspecified acute renal failure type ([facility] [address] [N17.9]  Acute congestive heart failure, unspecified heart failure type ([facility] [address] [I50.9]      Assessment:    Active Problems:    Hypertensive emergency  Resolved Problems:    * No resolved hospital problems. *      Plan:  Hypertensive emergency. Presents with blood pressure of the 906 systolic despite treatment in the ED  Started on nicardipine and admitted to the ICU  Hydralazine increased to 100 mg 3 times daily. Continued on Norvasc, added coreg, increase coreg to 12.5 mg bid    COVID-19 pneumonia  With no hypoxia. Continue monitoring, if patient becomes hypoxic and started on steroids and discuss further treatment. Monitor inflammatory markers. Bacterial pneumonia. Mild elevated procalcitonin. Although with no leukocytosis. I will start patient on cefdinir and doxy and monitor. Acute renal failure on chronic kidney disease stage III. Baseline creatinine around 2. Present with creatinine of 3.1. up to 3.3  Most likely due to hypertensive emergency  Consulted nephrology. Diabetes type 2. Continue with Lantus and short-acting insulin. Leg calf pain with edema  Obtain doppler to rule out DVT        Subjective:  Patient is being followed for Hypertensive emergency [I16.1]  Acute renal failure, unspecified acute renal failure type ([address] [N17.9]  Acute congestive heart failure, unspecified heart failure type ([address] [I50.9]   Pt feels ok,     ROS: denies fever, chills, cp, sob, n/v, HA unless stated above.       atorvastatin  20 mg Oral Nightly    insulin glargine  38 Units SubCUTAneous Nightly    losartan  100 mg Oral Daily    furosemide  20 mg IntraVENous BID    sodium chloride flush  10 mL IntraVENous 2 times per day    heparin (porcine)  5,000 Units SubCUTAneous 3 times per day    insulin lispro 0-6 Units SubCUTAneous TID     insulin lispro  0-3 Units SubCUTAneous Nightly    cefdinir  300 mg Oral Daily    doxycycline hyclate  100 mg Oral 2 times per day    hydrALAZINE  100 mg Oral 3 times per day    carvedilol  6.25 mg Oral BID      traZODone, 150 mg, Nightly PRN  glucose, 15 g, PRN  dextrose, 12.5 g, PRN  glucagon (rDNA), 1 mg, PRN  dextrose, 100 mL/hr, PRN  sodium chloride flush, 10 mL, PRN  sodium chloride, 25 mL, PRN  polyethylene glycol, 17 g, Daily PRN  acetaminophen, 650 mg, Q6H PRN   Or  acetaminophen, 650 mg, Q6H PRN  HYDROcodone 5 mg - acetaminophen, 1 tablet, Q6H PRN  sodium chloride flush, 10 mL, PRN         Objective:    BP (!) 150/64   Pulse 82   Temp 98.9 °F (37.2 °C) (Oral)   Resp 20   Ht 6' (1.829 m)   Wt 227 lb 11.2 oz (103.3 kg)   SpO2 95%   BMI 30.88 kg/m²     General Appearance: alert and oriented to person, place and time and in no acute distress  Skin: warm and dry  Head: normocephalic and atraumatic  Eyes: pupils equal, round, and reactive to light, extraocular eye movements intact, conjunctivae normal  Neck: neck supple and non tender without mass   Pulmonary/Chest: clear to auscultation bilaterally- no wheezes, rales or rhonchi, normal air movement, no respiratory distress  Cardiovascular: normal rate, normal S1 and S2 and no carotid bruits  Abdomen: soft, non-tender, non-distended, normal bowel sounds, no masses or organomegaly  Extremities: no cyanosis, no clubbing and trace edema  Neurologic: no cranial nerve deficit and speech normal        Recent Labs     12/23/21  0556 12/23/21  0901 12/23/21  1732 12/23/21  2142 12/24/21  0400     --  138  --  136   K 3.5  --  3.3*  --  3.3*     --  103  --  102   CO2 22  --  23  --  21*   BUN 44*  --  44*  --  46*   CREATININE 3.1*  --  3.2*  --  3.3*   GLUCOSE 162*   < > 224* 142 117*   CALCIUM 8.8  --  8.5*  --  8.4*    < > = values in this interval not displayed.        Recent Labs     12/22/21  5602 12/23/21  0556 12/24/21  0400   WBC 7.2 9.4 8.2   RBC 3.82 3.99 3.43*   HGB 10.9* 11.4* 9.9*   HCT 34.3* 35.7* 33.0*   MCV 89.8 89.5 96.2   MCH 28.5 28.6 28.9   MCHC 31.8* 31.9* 30.0*   RDW 14.3 14.3 14.6    213 174   MPV 11.4 10.9 11.0         NOTE: This report was transcribed using voice recognition software. Every effort was made to ensure accuracy; however, inadvertent computerized transcription errors may be present.   Electronically signed by Sharifa Love MD on 12/24/2021 at 10:45 AM

## 2021-12-24 NOTE — CONSULTS
[id number] [address]                                  CONSULTATION    PATIENT NAME: Chad Bello                         :        1940  MED REC NO:   [id number]                            ROOM:       21  ACCOUNT NO:   [de-identified]                           ADMIT DATE: 2021  PROVIDER:     Ludy Bernard MD    CONSULT DATE:  2021    REASON FOR CONSULTATION:  MELY, hypertension. HISTORY OF PRESENT ILLNESS:  The patient is an 42-year-old male we are  being asked to see for hypertension urgency and acute kidney injury on  top of CKD. The patient has a past medical history of COPD, hypertension, diabetes. He presented to the hospital with high blood pressure. He has been having blurry vision for the last few weeks. He _____. In the ED, blood pressure was found to be as high as 230/90 and he is  currently on nicardipine drip. When going to the ED, he is in isolation for COVID-19 pneumonia. Case  was discussed with the ED staff and ED nurses. His creatinine at baseline seems to be running around 2. He presented  with a creatinine of 3.1. REVIEW OF SYSTEMS:  Twelve-point done and negative except for those  mentioned above. ALLERGIES:  Include no known allergies. PAST MEDICAL HISTORY:  Positive for diabetes, hypertension. PAST SURGICAL HISTORY:  Positive for appendectomy. SOCIAL HISTORY:  No smoking. No drinking. FAMILY HISTORY:  Reviewed and noncontributory. MEDICATIONS:  Include trazodone, Lasix, losartan, Lipitor, Neurontin,  Advair, Norvasc. PHYSICAL EXAMINATION:  VITAL SIGNS:  Blood pressure 150/80, heart rate 74. He is on room air  satting 94%. No face-to-face encounter done as the patient is in isolation for  COVID-19. Case discussed with the ED staff. BLOOD WORK:  Creatinine 3.1, sodium 140, potassium 3.5. ASSESSMENT:  1. Acute kidney injury.   2. Chronic kidney disease, stage IV. 3.  COVID-19 positive. 4.  Hypertension urgency, currently on nicardipine drip. 5.  History of diastolic heart failure, compensated. PLAN:  1. The patient's acute kidney injury is likely related to his  uncontrolled hypertension, which likely has been going on at least for  the last few weeks supported by clinical history of a blurry vision and  feeling tired for few weeks. 2.  Agree with nicardipine drip. Continue Norvasc 10 mg. Continue  losartan 100 mg. I will increase hydralazine to 100 mg t.i.d.  3.  Try to wean off nicardipine drip. 4.  We will check basic metabolic panel later on today. If creatinine  is creeping up, we will need to scale back on his losartan. 5.  He is noticed to have nephrotic-range proteinuria, however, would  like to do workup and repeat proteinuria studies once the blood pressure  is under good control. If he continued to have nephrotic-range  proteinuria following having blood pressure under good control, then  this can be worked up, however, the _____ this is most likely related to  underlying diabetic nephropathy. 6.  Check ultrasound of the kidney as part of the workup. Thank you for involving us in taking care of the patient.         Candido Uribe MD    D: 12/23/2021 17:25:12       T: 12/23/2021 21:36:23     [email or web address]  Job#: [id number]     Doc#: [id number]    CC:

## 2021-12-25 ENCOUNTER — APPOINTMENT (OUTPATIENT)
Dept: CT IMAGING | Age: 81
DRG: 304 | End: 2021-12-25
Payer: MEDICARE

## 2021-12-25 LAB
ANION GAP SERPL CALCULATED.3IONS-SCNC: 14 MMOL/L (ref 7–16)
BASOPHILS ABSOLUTE: 0.03 E9/L (ref 0–0.2)
BASOPHILS RELATIVE PERCENT: 0.4 % (ref 0–2)
BUN BLDV-MCNC: 53 MG/DL (ref 6–23)
CALCIUM SERPL-MCNC: 8.6 MG/DL (ref 8.6–10.2)
CHLORIDE BLD-SCNC: 100 MMOL/L (ref 98–107)
CO2: 21 MMOL/L (ref 22–29)
CREAT SERPL-MCNC: 3.7 MG/DL (ref 0.7–1.2)
EOSINOPHILS ABSOLUTE: 0.07 E9/L (ref 0.05–0.5)
EOSINOPHILS RELATIVE PERCENT: 0.9 % (ref 0–6)
GFR AFRICAN AMERICAN: 19
GFR NON-AFRICAN AMERICAN: 16 ML/MIN/1.73
GLUCOSE BLD-MCNC: 156 MG/DL (ref 74–99)
HCT VFR BLD CALC: 32.8 % (ref 37–54)
HEMOGLOBIN: 10.7 G/DL (ref 12.5–16.5)
IMMATURE GRANULOCYTES #: 0.03 E9/L
IMMATURE GRANULOCYTES %: 0.4 % (ref 0–5)
LYMPHOCYTES ABSOLUTE: 1.16 E9/L (ref 1.5–4)
LYMPHOCYTES RELATIVE PERCENT: 14.6 % (ref 20–42)
MCH RBC QN AUTO: 29 PG (ref 26–35)
MCHC RBC AUTO-ENTMCNC: 32.6 % (ref 32–34.5)
MCV RBC AUTO: 88.9 FL (ref 80–99.9)
METER GLUCOSE: 133 MG/DL (ref 74–99)
METER GLUCOSE: 138 MG/DL (ref 74–99)
METER GLUCOSE: 182 MG/DL (ref 74–99)
METER GLUCOSE: 98 MG/DL (ref 74–99)
MONOCYTES ABSOLUTE: 1.48 E9/L (ref 0.1–0.95)
MONOCYTES RELATIVE PERCENT: 18.7 % (ref 2–12)
NEUTROPHILS ABSOLUTE: 5.15 E9/L (ref 1.8–7.3)
NEUTROPHILS RELATIVE PERCENT: 65 % (ref 43–80)
PDW BLD-RTO: 14.8 FL (ref 11.5–15)
PLATELET # BLD: 205 E9/L (ref 130–450)
PMV BLD AUTO: 10.6 FL (ref 7–12)
POTASSIUM REFLEX MAGNESIUM: 3.8 MMOL/L (ref 3.5–5)
PROSTATE SPECIFIC ANTIGEN: 2.24 NG/ML (ref 0–4)
RBC # BLD: 3.69 E12/L (ref 3.8–5.8)
SODIUM BLD-SCNC: 135 MMOL/L (ref 132–146)
WBC # BLD: 7.9 E9/L (ref 4.5–11.5)

## 2021-12-25 PROCEDURE — 6360000002 HC RX W HCPCS: Performed by: INTERNAL MEDICINE

## 2021-12-25 PROCEDURE — 36415 COLL VENOUS BLD VENIPUNCTURE: CPT

## 2021-12-25 PROCEDURE — 2580000003 HC RX 258: Performed by: INTERNAL MEDICINE

## 2021-12-25 PROCEDURE — 6370000000 HC RX 637 (ALT 250 FOR IP): Performed by: INTERNAL MEDICINE

## 2021-12-25 PROCEDURE — 80048 BASIC METABOLIC PNL TOTAL CA: CPT

## 2021-12-25 PROCEDURE — 2060000000 HC ICU INTERMEDIATE R&B

## 2021-12-25 PROCEDURE — 82962 GLUCOSE BLOOD TEST: CPT

## 2021-12-25 PROCEDURE — G0103 PSA SCREENING: HCPCS

## 2021-12-25 PROCEDURE — 74176 CT ABD & PELVIS W/O CONTRAST: CPT

## 2021-12-25 PROCEDURE — 85025 COMPLETE CBC W/AUTO DIFF WBC: CPT

## 2021-12-25 PROCEDURE — 99232 SBSQ HOSP IP/OBS MODERATE 35: CPT | Performed by: INTERNAL MEDICINE

## 2021-12-25 RX ORDER — SODIUM CHLORIDE 9 MG/ML
INJECTION, SOLUTION INTRAVENOUS CONTINUOUS
Status: DISCONTINUED | OUTPATIENT
Start: 2021-12-25 | End: 2021-12-27

## 2021-12-25 RX ORDER — LIDOCAINE 40 MG/G
CREAM TOPICAL PRN
Status: DISCONTINUED | OUTPATIENT
Start: 2021-12-25 | End: 2021-12-27 | Stop reason: HOSPADM

## 2021-12-25 RX ADMIN — ACETAMINOPHEN 650 MG: 325 TABLET ORAL at 02:19

## 2021-12-25 RX ADMIN — HYDROCODONE BITARTRATE AND ACETAMINOPHEN 1 TABLET: 5; 325 TABLET ORAL at 12:53

## 2021-12-25 RX ADMIN — CEFDINIR 300 MG: 300 CAPSULE ORAL at 08:20

## 2021-12-25 RX ADMIN — Medication 10 ML: at 21:18

## 2021-12-25 RX ADMIN — BUDESONIDE AND FORMOTEROL FUMARATE DIHYDRATE 2 PUFF: 160; 4.5 AEROSOL RESPIRATORY (INHALATION) at 08:26

## 2021-12-25 RX ADMIN — DOXYCYCLINE HYCLATE 100 MG: 100 CAPSULE ORAL at 08:26

## 2021-12-25 RX ADMIN — HYDRALAZINE HYDROCHLORIDE 100 MG: 50 TABLET ORAL at 14:49

## 2021-12-25 RX ADMIN — CARVEDILOL 25 MG: 25 TABLET, FILM COATED ORAL at 18:09

## 2021-12-25 RX ADMIN — LIDOCAINE 4%: 4 CREAM TOPICAL at 21:02

## 2021-12-25 RX ADMIN — SODIUM CHLORIDE: 9 INJECTION, SOLUTION INTRAVENOUS at 21:17

## 2021-12-25 RX ADMIN — CARVEDILOL 25 MG: 25 TABLET, FILM COATED ORAL at 08:21

## 2021-12-25 RX ADMIN — HEPARIN SODIUM 5000 UNITS: 10000 INJECTION INTRAVENOUS; SUBCUTANEOUS at 22:38

## 2021-12-25 RX ADMIN — HEPARIN SODIUM 5000 UNITS: 10000 INJECTION INTRAVENOUS; SUBCUTANEOUS at 14:49

## 2021-12-25 RX ADMIN — LOSARTAN POTASSIUM 100 MG: 50 TABLET, FILM COATED ORAL at 08:21

## 2021-12-25 RX ADMIN — HYDROCODONE BITARTRATE AND ACETAMINOPHEN 1 TABLET: 5; 325 TABLET ORAL at 04:21

## 2021-12-25 RX ADMIN — FUROSEMIDE 20 MG: 10 INJECTION, SOLUTION INTRAMUSCULAR; INTRAVENOUS at 08:22

## 2021-12-25 RX ADMIN — HEPARIN SODIUM 5000 UNITS: 10000 INJECTION INTRAVENOUS; SUBCUTANEOUS at 08:21

## 2021-12-25 RX ADMIN — DOXYCYCLINE HYCLATE 100 MG: 100 CAPSULE ORAL at 20:50

## 2021-12-25 RX ADMIN — HYDRALAZINE HYDROCHLORIDE 100 MG: 50 TABLET ORAL at 20:49

## 2021-12-25 RX ADMIN — INSULIN GLARGINE 38 UNITS: 100 INJECTION, SOLUTION SUBCUTANEOUS at 22:36

## 2021-12-25 RX ADMIN — HYDROCODONE BITARTRATE AND ACETAMINOPHEN 1 TABLET: 5; 325 TABLET ORAL at 20:50

## 2021-12-25 RX ADMIN — ATORVASTATIN CALCIUM 20 MG: 40 TABLET, FILM COATED ORAL at 20:50

## 2021-12-25 RX ADMIN — TRAZODONE HYDROCHLORIDE 150 MG: 150 TABLET ORAL at 20:50

## 2021-12-25 RX ADMIN — INSULIN LISPRO 1 UNITS: 100 INJECTION, SOLUTION INTRAVENOUS; SUBCUTANEOUS at 22:37

## 2021-12-25 RX ADMIN — HYDRALAZINE HYDROCHLORIDE 100 MG: 50 TABLET ORAL at 08:21

## 2021-12-25 RX ADMIN — LIDOCAINE 4%: 4 CREAM TOPICAL at 16:28

## 2021-12-25 RX ADMIN — FUROSEMIDE 20 MG: 10 INJECTION, SOLUTION INTRAMUSCULAR; INTRAVENOUS at 18:06

## 2021-12-25 RX ADMIN — BUDESONIDE AND FORMOTEROL FUMARATE DIHYDRATE 2 PUFF: 160; 4.5 AEROSOL RESPIRATORY (INHALATION) at 21:17

## 2021-12-25 RX ADMIN — Medication 10 ML: at 08:30

## 2021-12-25 ASSESSMENT — PAIN SCALES - GENERAL
PAINLEVEL_OUTOF10: 9
PAINLEVEL_OUTOF10: 5
PAINLEVEL_OUTOF10: 7
PAINLEVEL_OUTOF10: 8
PAINLEVEL_OUTOF10: 7

## 2021-12-25 NOTE — HOME CARE
Current with [facility] for Yosef, PT, OT, SW, HHA. Will need ADEN orders prior to discharge if appropriate. Kym Lim LPN, [facility].

## 2021-12-25 NOTE — CONSULTS
[address] [address]                                  CONSULTATION    PATIENT NAME: Abelardo Reyes                         :        1940  MED REC NO:   [id number]                            ROOM:       0615  ACCOUNT NO:   [de-identified]                           ADMIT DATE: 2021  PROVIDER:     Fabian Tao MD    CONSULT DATE:  2021    CHIEF COMPLAINT:  Urinary incontinence, azotemia, and hypertension. HISTORY OF PRESENT ILLNESS:  This 80-year-old male who has had  longstanding frequency, urgency, and urgency incontinence, which started  him while wearing Depends about two years ago. The patient has not had  hematuria nor has he had any history of urinary tract infection. The  patient did not complain about his incontinence and has never to his  knowledge, taken any medication for prostate enlargement. The patient  is one of 10 siblings; none of his brothers that he is aware of or his  father, has had prostate issues. The patient is known to have diabetes,  hypertension, hyperlipidemia, and neuropathy. His serum creatinine from  the computer was 2.8 in . The patient had an ultrasound of his  bladder which showed that he was carrying a high residual urine and  postvoiding, he still had a high postvoid residual urine. The patient  does not have complaints of discomfort in his lower abdomen. His reason  for being in the hospital is; he presented following a fall which  occurred about a week ago and he had weakness, was found to be severely  hypertensive and was admitted for further evaluation. PAST MEDICAL HISTORY:  Includes diabetes, a left footdrop,  hyperlipidemia, hypertension and neuropathy. PAST SURGICAL HISTORY:  The patient has had an appendectomy, back  surgery, bilateral knee replacement, and carpal tunnel release.     SOCIAL HISTORY:  The patient has not smoked cigarettes for over 50  years. He smoked for approximately 10 years prior to that. FAMILY HISTORY:  As mentioned above, no history of urinary or prostate  issues that he is aware of. ALLERGIES:  None known to medication. MEDICATIONS:  His medications now consist of Coreg, Symbicort,  albuterol, Cozaar, Desyrel, Lasix, glucose, GlycoLax, Humalog, Cardene,  and Omnicef. PHYSICAL EXAMINATION:  The patient it is a very alert and oriented male  who does not appear to be in distress. Abdomen:  He does not have any  tenderness in his suprapubic area. His penis shows a complete phimosis  and inability to retract his foreskin. Testes are normal.  Extremities:  No apparent edema. IMPRESSION AND PLAN:  Azotemia with a history of urinary incontinence  and postvoid residual.  A Ernst catheter was inserted by myself with a  residual of over 400 mL. The urologic plan is to check his PSA. A CAT  scan will be ordered. The Ernst will be left in place and his azotemia  followed. If he has an obstructive component, we would expect his  creatinine to diminish. We will follow him through this hospital stay  and post discharge.         Joe Shelton MD    D: 12/25/2021 8:06:14       T: 12/25/2021 8:09:43     [email or web address]  Job#: [id number]     Doc#: [id number]    CC:

## 2021-12-25 NOTE — PROGRESS NOTES
[facility] Progress Note    Admitting Date and Time: 12/22/2021  7:40 PM  Admit Dx: Hypertensive emergency [I16.1]  Acute renal failure, unspecified acute renal failure type ([facility] [address] [N17.9]  Acute congestive heart failure, unspecified heart failure type ([address] [I50.9]      Assessment:    Active Problems:    Hypertensive emergency  Resolved Problems:    * No resolved hospital problems. *      Plan:  Hypertensive emergency. Presents with blood pressure of the 262 systolic despite treatment in the ED  Started on nicardipine and admitted to the ICU  Hydralazine increased to 100 mg 3 times daily. Continued on Norvasc, added coreg, increase coreg to 12.5 mg bid    COVID-19 pneumonia  With no hypoxia. Continue monitoring, if patient becomes hypoxic and started on steroids and discuss further treatment. Monitor inflammatory markers. Bacterial pneumonia. Mild elevated procalcitonin. Although with no leukocytosis. I will start patient on cefdinir and doxy and monitor. Acute renal failure on chronic kidney disease stage III. Baseline creatinine around 2. Present with creatinine of 3.1. up to 3.3  Most likely due to hypertensive emergency  Consulted nephrology. Diabetes type 2. Continue with Lantus and short-acting insulin. Leg calf pain with edema  Obtain doppler to rule out DVT        Subjective:  Patient is being followed for Hypertensive emergency [I16.1]  Acute renal failure, unspecified acute renal failure type ([address] [N17.9]  Acute congestive heart failure, unspecified heart failure type ([address] [I50.9]   Pt feels ok,     ROS: denies fever, chills, cp, sob, n/v, HA unless stated above.       carvedilol  25 mg Oral BID     budesonide-formoterol  2 puff Inhalation BID    atorvastatin  20 mg Oral Nightly    insulin glargine  38 Units SubCUTAneous Nightly    losartan  100 mg Oral Daily    furosemide  20 mg IntraVENous BID    sodium chloride flush  10 mL IntraVENous 2 times per day  heparin (porcine)  5,000 Units SubCUTAneous 3 times per day    insulin lispro  0-6 Units SubCUTAneous TID     insulin lispro  0-3 Units SubCUTAneous Nightly    cefdinir  300 mg Oral Daily    doxycycline hyclate  100 mg Oral 2 times per day    hydrALAZINE  100 mg Oral 3 times per day     albuterol sulfate HFA, 2 puff, Q4H PRN  traZODone, 150 mg, Nightly PRN  glucose, 15 g, PRN  dextrose, 12.5 g, PRN  glucagon (rDNA), 1 mg, PRN  dextrose, 100 mL/hr, PRN  sodium chloride flush, 10 mL, PRN  sodium chloride, 25 mL, PRN  polyethylene glycol, 17 g, Daily PRN  acetaminophen, 650 mg, Q6H PRN   Or  acetaminophen, 650 mg, Q6H PRN  HYDROcodone 5 mg - acetaminophen, 1 tablet, Q6H PRN  sodium chloride flush, 10 mL, PRN         Objective:    BP (!) 150/73   Pulse 60   Temp 98.1 °F (36.7 °C)   Resp 20   Ht 6' (1.829 m)   Wt 223 lb 9.6 oz (101.4 kg)   SpO2 98%   BMI 30.33 kg/m²     General Appearance: alert and oriented to person, place and time and in no acute distress  Skin: warm and dry  Head: normocephalic and atraumatic  Eyes: pupils equal, round, and reactive to light, extraocular eye movements intact, conjunctivae normal  Neck: neck supple and non tender without mass   Pulmonary/Chest: clear to auscultation bilaterally- no wheezes, rales or rhonchi, normal air movement, no respiratory distress  Cardiovascular: normal rate, normal S1 and S2 and no carotid bruits  Abdomen: soft, non-tender, non-distended, normal bowel sounds, no masses or organomegaly  Extremities: no cyanosis, no clubbing and trace edema  Neurologic: no cranial nerve deficit and speech normal        Recent Labs     12/23/21  0556 12/23/21  0901 12/23/21  1732 12/23/21  2142 12/24/21  0400     --  138  --  136   K 3.5  --  3.3*  --  3.3*     --  103  --  102   CO2 22  --  23  --  21*   BUN 44*  --  44*  --  46*   CREATININE 3.1*  --  3.2*  --  3.3*   GLUCOSE 162*   < > 224* 142 117*   CALCIUM 8.8  --  8.5*  --  8.4*    < > = values in this interval not displayed. Recent Labs     12/22/21  2111 12/23/21  0556 12/24/21  0400   WBC 7.2 9.4 8.2   RBC 3.82 3.99 3.43*   HGB 10.9* 11.4* 9.9*   HCT 34.3* 35.7* 33.0*   MCV 89.8 89.5 96.2   MCH 28.5 28.6 28.9   MCHC 31.8* 31.9* 30.0*   RDW 14.3 14.3 14.6    213 174   MPV 11.4 10.9 11.0         NOTE: This report was transcribed using voice recognition software. Every effort was made to ensure accuracy; however, inadvertent computerized transcription errors may be present.   Electronically signed by Santhosh Guadalupe MD on 12/25/2021 at 10:36 AM

## 2021-12-26 LAB
ANION GAP SERPL CALCULATED.3IONS-SCNC: 14 MMOL/L (ref 7–16)
BASOPHILS ABSOLUTE: 0.04 E9/L (ref 0–0.2)
BASOPHILS RELATIVE PERCENT: 0.4 % (ref 0–2)
BUN BLDV-MCNC: 53 MG/DL (ref 6–23)
CALCIUM SERPL-MCNC: 8.4 MG/DL (ref 8.6–10.2)
CHLORIDE BLD-SCNC: 99 MMOL/L (ref 98–107)
CO2: 22 MMOL/L (ref 22–29)
CREAT SERPL-MCNC: 3.9 MG/DL (ref 0.7–1.2)
EOSINOPHILS ABSOLUTE: 0.08 E9/L (ref 0.05–0.5)
EOSINOPHILS RELATIVE PERCENT: 0.9 % (ref 0–6)
GFR AFRICAN AMERICAN: 18
GFR NON-AFRICAN AMERICAN: 15 ML/MIN/1.73
GLUCOSE BLD-MCNC: 38 MG/DL (ref 74–99)
HCT VFR BLD CALC: 31 % (ref 37–54)
HEMOGLOBIN: 9.9 G/DL (ref 12.5–16.5)
IMMATURE GRANULOCYTES #: 0.04 E9/L
IMMATURE GRANULOCYTES %: 0.4 % (ref 0–5)
LYMPHOCYTES ABSOLUTE: 1.43 E9/L (ref 1.5–4)
LYMPHOCYTES RELATIVE PERCENT: 15.5 % (ref 20–42)
MCH RBC QN AUTO: 28.6 PG (ref 26–35)
MCHC RBC AUTO-ENTMCNC: 31.9 % (ref 32–34.5)
MCV RBC AUTO: 89.6 FL (ref 80–99.9)
METER GLUCOSE: 102 MG/DL (ref 74–99)
METER GLUCOSE: 115 MG/DL (ref 74–99)
METER GLUCOSE: 88 MG/DL (ref 74–99)
METER GLUCOSE: 96 MG/DL (ref 74–99)
MONOCYTES ABSOLUTE: 1.87 E9/L (ref 0.1–0.95)
MONOCYTES RELATIVE PERCENT: 20.3 % (ref 2–12)
NEUTROPHILS ABSOLUTE: 5.76 E9/L (ref 1.8–7.3)
NEUTROPHILS RELATIVE PERCENT: 62.5 % (ref 43–80)
OVALOCYTES: ABNORMAL
PDW BLD-RTO: 14.7 FL (ref 11.5–15)
PLATELET # BLD: 229 E9/L (ref 130–450)
PMV BLD AUTO: 11.4 FL (ref 7–12)
POIKILOCYTES: ABNORMAL
POTASSIUM REFLEX MAGNESIUM: 3.9 MMOL/L (ref 3.5–5)
RBC # BLD: 3.46 E12/L (ref 3.8–5.8)
SODIUM BLD-SCNC: 135 MMOL/L (ref 132–146)
WBC # BLD: 9.2 E9/L (ref 4.5–11.5)

## 2021-12-26 PROCEDURE — 6370000000 HC RX 637 (ALT 250 FOR IP): Performed by: INTERNAL MEDICINE

## 2021-12-26 PROCEDURE — 36415 COLL VENOUS BLD VENIPUNCTURE: CPT

## 2021-12-26 PROCEDURE — 2500000003 HC RX 250 WO HCPCS: Performed by: INTERNAL MEDICINE

## 2021-12-26 PROCEDURE — 2060000000 HC ICU INTERMEDIATE R&B

## 2021-12-26 PROCEDURE — 6360000002 HC RX W HCPCS: Performed by: INTERNAL MEDICINE

## 2021-12-26 PROCEDURE — 82962 GLUCOSE BLOOD TEST: CPT

## 2021-12-26 PROCEDURE — 80048 BASIC METABOLIC PNL TOTAL CA: CPT

## 2021-12-26 PROCEDURE — 2580000003 HC RX 258: Performed by: INTERNAL MEDICINE

## 2021-12-26 PROCEDURE — 99233 SBSQ HOSP IP/OBS HIGH 50: CPT | Performed by: INTERNAL MEDICINE

## 2021-12-26 PROCEDURE — 85025 COMPLETE CBC W/AUTO DIFF WBC: CPT

## 2021-12-26 RX ORDER — INSULIN GLARGINE 100 [IU]/ML
25 INJECTION, SOLUTION SUBCUTANEOUS NIGHTLY
Status: DISCONTINUED | OUTPATIENT
Start: 2021-12-26 | End: 2021-12-27 | Stop reason: HOSPADM

## 2021-12-26 RX ADMIN — HYDRALAZINE HYDROCHLORIDE 100 MG: 50 TABLET ORAL at 21:22

## 2021-12-26 RX ADMIN — FUROSEMIDE 20 MG: 10 INJECTION, SOLUTION INTRAMUSCULAR; INTRAVENOUS at 09:40

## 2021-12-26 RX ADMIN — FUROSEMIDE 20 MG: 10 INJECTION, SOLUTION INTRAMUSCULAR; INTRAVENOUS at 17:29

## 2021-12-26 RX ADMIN — SODIUM CHLORIDE: 9 INJECTION, SOLUTION INTRAVENOUS at 17:33

## 2021-12-26 RX ADMIN — HYDROCODONE BITARTRATE AND ACETAMINOPHEN 1 TABLET: 5; 325 TABLET ORAL at 22:03

## 2021-12-26 RX ADMIN — CEFDINIR 300 MG: 300 CAPSULE ORAL at 09:40

## 2021-12-26 RX ADMIN — Medication 12.5 G: at 05:24

## 2021-12-26 RX ADMIN — HEPARIN SODIUM 5000 UNITS: 10000 INJECTION INTRAVENOUS; SUBCUTANEOUS at 14:06

## 2021-12-26 RX ADMIN — ALBUTEROL SULFATE 2 PUFF: 90 AEROSOL, METERED RESPIRATORY (INHALATION) at 09:41

## 2021-12-26 RX ADMIN — HYDROCODONE BITARTRATE AND ACETAMINOPHEN 1 TABLET: 5; 325 TABLET ORAL at 09:40

## 2021-12-26 RX ADMIN — DOXYCYCLINE HYCLATE 100 MG: 100 CAPSULE ORAL at 21:22

## 2021-12-26 RX ADMIN — Medication 10 ML: at 22:06

## 2021-12-26 RX ADMIN — HYDROCODONE BITARTRATE AND ACETAMINOPHEN 1 TABLET: 5; 325 TABLET ORAL at 16:02

## 2021-12-26 RX ADMIN — HYDROCODONE BITARTRATE AND ACETAMINOPHEN 1 TABLET: 5; 325 TABLET ORAL at 03:58

## 2021-12-26 RX ADMIN — HEPARIN SODIUM 5000 UNITS: 10000 INJECTION INTRAVENOUS; SUBCUTANEOUS at 06:41

## 2021-12-26 RX ADMIN — BUDESONIDE AND FORMOTEROL FUMARATE DIHYDRATE 2 PUFF: 160; 4.5 AEROSOL RESPIRATORY (INHALATION) at 09:45

## 2021-12-26 RX ADMIN — HYDRALAZINE HYDROCHLORIDE 100 MG: 50 TABLET ORAL at 06:42

## 2021-12-26 RX ADMIN — CARVEDILOL 25 MG: 25 TABLET, FILM COATED ORAL at 09:40

## 2021-12-26 RX ADMIN — ALBUTEROL SULFATE 2 PUFF: 90 AEROSOL, METERED RESPIRATORY (INHALATION) at 21:22

## 2021-12-26 RX ADMIN — HYDRALAZINE HYDROCHLORIDE 100 MG: 50 TABLET ORAL at 14:06

## 2021-12-26 RX ADMIN — CARVEDILOL 25 MG: 25 TABLET, FILM COATED ORAL at 16:02

## 2021-12-26 RX ADMIN — BUDESONIDE AND FORMOTEROL FUMARATE DIHYDRATE 2 PUFF: 160; 4.5 AEROSOL RESPIRATORY (INHALATION) at 21:22

## 2021-12-26 RX ADMIN — HEPARIN SODIUM 5000 UNITS: 10000 INJECTION INTRAVENOUS; SUBCUTANEOUS at 22:14

## 2021-12-26 RX ADMIN — ATORVASTATIN CALCIUM 20 MG: 40 TABLET, FILM COATED ORAL at 21:22

## 2021-12-26 RX ADMIN — Medication 10 ML: at 17:34

## 2021-12-26 RX ADMIN — Medication 10 ML: at 09:00

## 2021-12-26 RX ADMIN — DOXYCYCLINE HYCLATE 100 MG: 100 CAPSULE ORAL at 09:40

## 2021-12-26 ASSESSMENT — PAIN DESCRIPTION - LOCATION: LOCATION: HIP

## 2021-12-26 ASSESSMENT — PAIN SCALES - GENERAL
PAINLEVEL_OUTOF10: 8
PAINLEVEL_OUTOF10: 4
PAINLEVEL_OUTOF10: 9
PAINLEVEL_OUTOF10: 0
PAINLEVEL_OUTOF10: 4
PAINLEVEL_OUTOF10: 8
PAINLEVEL_OUTOF10: 9
PAINLEVEL_OUTOF10: 5

## 2021-12-26 ASSESSMENT — PAIN DESCRIPTION - PAIN TYPE: TYPE: CHRONIC PAIN

## 2021-12-26 ASSESSMENT — PAIN DESCRIPTION - ORIENTATION: ORIENTATION: LEFT

## 2021-12-26 NOTE — PROGRESS NOTES
[facility] Progress Note    Admitting Date and Time: 12/22/2021  7:40 PM  Admit Dx: Hypertensive emergency [I16.1]  Acute renal failure, unspecified acute renal failure type ([facility] [address] [N17.9]  Acute congestive heart failure, unspecified heart failure type ([facility] [address] [I50.9]      Assessment:    Active Problems:    Hypertensive emergency  Resolved Problems:    * No resolved hospital problems. *      Plan:  Hypertensive emergency. Presents with blood pressure of the 988 systolic despite treatment in the ED  Started on nicardipine and admitted to the ICU  Hydralazine increased to 100 mg 3 times daily. Continued on Norvasc, added coreg, increase coreg to 25 mg bid    COVID-19 pneumonia  With no hypoxia. Continue monitoring, if patient becomes hypoxic and started on steroids and discuss further treatment. Monitor inflammatory markers. Bacterial pneumonia. Mild elevated procalcitonin. Although with no leukocytosis. On cefdinir and doxy and monitor. Acute renal failure on chronic kidney disease stage III. Baseline creatinine around 2. Present with creatinine of 3.1. up to 3.9  Most likely due to hypertensive emergency  Consulted nephrology. Hypoglycemia  BG 38, decrease lantus to 25 units      Diabetes type 2. Continue with Lantus and short-acting insulin. Decrease lantus dose to 25 units    Leg calf pain with edema  Doppler ruled out DVT    Urinary retention  Bladder outlet obstruction  Ernst in place  Appreciate urology input      Subjective:  Patient is being followed for Hypertensive emergency [I16.1]  Acute renal failure, unspecified acute renal failure type ([address] [N17.9]  Acute congestive heart failure, unspecified heart failure type ([address] [I50.9]   Pt feels ok,     ROS: denies fever, chills, cp, sob, n/v, HA unless stated above.       insulin glargine  25 Units SubCUTAneous Nightly    carvedilol  25 mg Oral BID WC    budesonide-formoterol  2 puff Inhalation BID    atorvastatin  20 mg Oral Nightly    [Held by provider] losartan  100 mg Oral Daily    furosemide  20 mg IntraVENous BID    sodium chloride flush  10 mL IntraVENous 2 times per day    heparin (porcine)  5,000 Units SubCUTAneous 3 times per day    insulin lispro  0-6 Units SubCUTAneous TID WC    insulin lispro  0-3 Units SubCUTAneous Nightly    cefdinir  300 mg Oral Daily    doxycycline hyclate  100 mg Oral 2 times per day    hydrALAZINE  100 mg Oral 3 times per day     lidocaine, , PRN  albuterol sulfate HFA, 2 puff, Q4H PRN  traZODone, 150 mg, Nightly PRN  glucose, 15 g, PRN  dextrose, 12.5 g, PRN  glucagon (rDNA), 1 mg, PRN  dextrose, 100 mL/hr, PRN  sodium chloride flush, 10 mL, PRN  sodium chloride, 25 mL, PRN  polyethylene glycol, 17 g, Daily PRN  acetaminophen, 650 mg, Q6H PRN   Or  acetaminophen, 650 mg, Q6H PRN  HYDROcodone 5 mg - acetaminophen, 1 tablet, Q6H PRN  sodium chloride flush, 10 mL, PRN         Objective:    BP (!) 172/75   Pulse 64   Temp 98.8 °F (37.1 °C) (Axillary)   Resp 17   Ht 6' (1.829 m)   Wt 223 lb 9.6 oz (101.4 kg)   SpO2 97%   BMI 30.33 kg/m²     General Appearance: alert and oriented to person, place and time and in no acute distress  Skin: warm and dry  Head: normocephalic and atraumatic  Eyes: pupils equal, round, and reactive to light, extraocular eye movements intact, conjunctivae normal  Neck: neck supple and non tender without mass   Pulmonary/Chest: clear to auscultation bilaterally- no wheezes, rales or rhonchi, normal air movement, no respiratory distress  Cardiovascular: normal rate, normal S1 and S2 and no carotid bruits  Abdomen: soft, non-tender, non-distended, normal bowel sounds, no masses or organomegaly  Extremities: no cyanosis, no clubbing and trace edema  Neurologic: no cranial nerve deficit and speech normal        Recent Labs     12/24/21  0400 12/25/21  1244 12/26/21  0409    135 135   K 3.3* 3.8 3.9    100 99   CO2 21* 21* 22   BUN 46* 53* 53* CREATININE 3.3* 3.7* 3.9*   GLUCOSE 117* 156* 38*   CALCIUM 8.4* 8.6 8.4*       Recent Labs     12/24/21  0400 12/25/21  1244 12/26/21  0409   WBC 8.2 7.9 9.2   RBC 3.43* 3.69* 3.46*   HGB 9.9* 10.7* 9.9*   HCT 33.0* 32.8* 31.0*   MCV 96.2 88.9 89.6   MCH 28.9 29.0 28.6   MCHC 30.0* 32.6 31.9*   RDW 14.6 14.8 14.7    205 229   MPV 11.0 10.6 11.4         NOTE: This report was transcribed using voice recognition software. Every effort was made to ensure accuracy; however, inadvertent computerized transcription errors may be present.   Electronically signed by Fany Garza MD on 12/26/2021 at 9:29 AM

## 2021-12-26 NOTE — PROGRESS NOTES
[facility] MD Mich Valentine MD. Cindia Meter MD Corrinne Lies MD  Progress Note    12/25/2021    SUBJECTIVE:   Off nicardipine drip   BP still on higher side   Cr up  On RA  D/w RN on floor     PROBLEM LIST:    Active Problems:    Hypertensive emergency  Resolved Problems:    * No resolved hospital problems. *       DIET:    ADULT DIET; Regular; No Added Salt (3-4 gm)       Allergies : Patient has no known allergies. Past Medical History:   Diagnosis Date    Bronchitis     recent, abx completed, has minimal cough at this time    Carpal tunnel syndrome, right     [facility]chronic obstructive pulmonary disease) ([facility])     Diabetes ([address]     Foot drop, left     dt nerve damage    Hyperlipidemia     Hypertension     Neuropathy     left leg       Past Surgical History:   Procedure Laterality Date    APPENDECTOMY      BACK SURGERY  2012    lumbar    CARPAL TUNNEL RELEASE Right 04/26/2017    JOINT REPLACEMENT Bilateral 1989    knees       No family history on file. reports that he quit smoking about 52 years ago. He started smoking about 61 years ago. He has a 9.00 pack-year smoking history. His smokeless tobacco use includes snuff. He reports that he does not drink alcohol and does not use drugs. Review of Systems:   Constitutional: no fevers , no chills , feels ok   Eyes: no eye pain , no itching , no drainage  Ears, nose, mouth, throat, and face: no ear ,nose pain , hearing is ok ,no nasal drainage   Respiratory: no sob ,no cough ,no wheezing . Cardiovascular: no chest pain , no palpitation ,no sob . Gastrointestinal: no nausea, vomiting , constipation , no abdominal pain . Genitourinary:no urinary retention , no burning , dysuria . No polyuria   Hematologic/lymphatic: no bleeding , no cougulation issues . Musculoskeletal:no joint pain , no swelling . Neurological: no headaches ,no weakness , no numbness .    Endocrine: no thirst , no weight issues .      MEDS (scheduled):    carvedilol  25 mg Oral BID WC    budesonide-formoterol  2 puff Inhalation BID    atorvastatin  20 mg Oral Nightly    insulin glargine  38 Units SubCUTAneous Nightly    [Held by provider] losartan  100 mg Oral Daily    furosemide  20 mg IntraVENous BID    sodium chloride flush  10 mL IntraVENous 2 times per day    heparin (porcine)  5,000 Units SubCUTAneous 3 times per day    insulin lispro  0-6 Units SubCUTAneous TID WC    insulin lispro  0-3 Units SubCUTAneous Nightly    cefdinir  300 mg Oral Daily    doxycycline hyclate  100 mg Oral 2 times per day    hydrALAZINE  100 mg Oral 3 times per day       MEDS (infusions):   dextrose      sodium chloride      niCARdipene (CARDENE) 50 mg in 250 mL 0.9 % sodium chloride infusion Stopped (12/23/21 1833)       MEDS (prn):  lidocaine, albuterol sulfate HFA, traZODone, glucose, dextrose, glucagon (rDNA), dextrose, sodium chloride flush, sodium chloride, polyethylene glycol, acetaminophen **OR** acetaminophen, HYDROcodone 5 mg - acetaminophen, sodium chloride flush    PHYSICAL EXAM:     Patient Vitals for the past 24 hrs:   BP Temp Temp src Pulse Resp SpO2 Weight   12/25/21 1809 (!) 173/74   72      12/25/21 1447 (!) 137/58 98.8 °F (37.1 °C) Oral 60 18 96 %    12/25/21 0823 (!) 150/73 98.1 °F (36.7 °C) Oral 60 18 98 %    12/25/21 0122       223 lb 9.6 oz (101.4 kg)   12/24/21 2200 (!) 153/91 98 °F (36.7 °C) Oral 66 20 98 %    @      Intake/Output Summary (Last 24 hours) at 12/25/2021 1929  Last data filed at 12/24/2021 2337  Gross per 24 hour   Intake    Output 450 ml   Net -450 ml         Wt Readings from Last 3 Encounters:   12/25/21 223 lb 9.6 oz (101.4 kg)   12/15/21 240 lb (108.9 kg)   12/12/21 236 lb (107 kg)       Constitutional:  in no acute distress  Oral: mucus membranes moist  Neck: no JVD  Cardiovascular: S1, S2 regular rhythm, no murmur,or rub  Respiratory:  No crackles, no wheeze  Gastrointestinal: Soft, nontender, nondistended, NABS  Ext: no edema, feet warm  Skin: dry, no rash  Neuro: awake, alert, interactive      DATA:    Recent Labs     12/23/21  0556 12/24/21  0400 12/25/21  1244   WBC 9.4 8.2 7.9   HGB 11.4* 9.9* 10.7*   HCT 35.7* 33.0* 32.8*   MCV 89.5 96.2 88.9    174 205     Recent Labs     12/23/21  0556 12/23/21  0556 12/23/21  1732 12/24/21  0400 12/25/21  1244      < > 138 136 135   K 3.5  --  3.3* 3.3* 3.8      < > 103 102 100   CO2 22   < > 23 21* 21*   MG 2.3  --   --  2.3  --    BUN 44*   < > 44* 46* 53*   CREATININE 3.1*   < > 3.2* 3.3* 3.7*    < > = values in this interval not displayed. No results found for: LABPROT    ASSESSMENT / RECOMMENDATIONS:      1. Acute kidney injury. The patient's acute kidney injury is likely related to his  uncontrolled hypertension, which likely has been going on at least for  the last few weeks supported by clinical history of a blurry vision and  feeling tired for few weeks. 2.  Chronic kidney disease, stage IV. he is noticed to have nephrotic-range proteinuria, however, would  like to do workup and repeat proteinuria studies once the blood pressure  is under good control. If he continued to have nephrotic-range  proteinuria following having blood pressure under good control, then  this can be worked up, however, the _____ this is most likely related to  underlying diabetic nephropathy. 3.  COVID-19 positive. 4.  Hypertension urgency, currently on nicardipine drip. 5.  History of diastolic heart failure, compensated.     PLAN:       Cr going up (expected with better bp control as this can lead to decrease renal perfusion transiently )  Would hold losartan   Continue coreg to 25 mg bid / continue hydralzine ,amlodipine .    Start NS at 65 cc/hr   US results noted , will take the liberty and ask urology to see   Bmp in am   Will d/w dr Juan Handy in am       Electronically signed by Magdi Bacon MD on 12/25/2021 at 7:29 PM

## 2021-12-26 NOTE — PROGRESS NOTES
[facility] PROGRESS NOTE                                                                       2021          SUBJECTIVE:    Pt has a renal cysto on ct without hydro bladder very thick indicatiing bladder outlet obst  Beauchamp iinserted yesterday but creatinine the same  Pt comfortable with beauchamp  Urine clear  psa 2.24    OBJECTIVE:    BP (!) 172/75   Pulse 64   Temp 98.8 °F (37.1 °C) (Axillary)   Resp 17   Ht 6' (1.829 m)   Wt 223 lb 9.6 oz (101.4 kg)   SpO2 97%   BMI 30.33 kg/m²     PHYSICAL EXAMINATION:  Skin: dry, without rashes  Respirations: non-labored, intact  Abdomen: soft, non-tender, non-distended      Lab Results   Component Value Date    WBC 9.2 2021    HGB 9.9 (L) 2021    HCT 31.0 (L) 2021    MCV 89.6 2021     2021       Lab Results   Component Value Date    CREATININE 3.9 (H) 2021       Lab Results   Component Value Date    PSA 2.24 2021       REVIEW OF SYSTEMS:    CONSTITUTIONAL: negative  HEENT: negative  HEMATOLOGIC: negative  ENDOCRINE: negative  RESPIRATORY: negative  CV: negative  GI: negative  NEURO: negative  ORTHOPEDICS: negative  PSYCHIATRIC: negative  : as above    PAST FAMILY HISTORY:  No family history on file.   PAST SOCIAL HISTORY:    Social History     Socioeconomic History    Marital status:      Spouse name: Not on file    Number of children: Not on file    Years of education: Not on file    Highest education level: Not on file   Occupational History    Not on file   Tobacco Use    Smoking status: Former Smoker     Packs/day: 1.00     Years: 9.00     Pack years: 9.00     Start date: 1960     Quit date: 3/17/1969     Years since quittin.8    Smokeless tobacco: Current User     Types: Snuff   Vaping Use    Vaping Use: Never used   Substance and Sexual Activity    Alcohol use: No    Drug use: No    Sexual activity: Not on file   Other Topics Concern    Not on file   Social History Narrative    Not on file     Social Determinants of Health     Financial Resource Strain:     Difficulty of Paying Living Expenses: Not on file   Food Insecurity:     Worried About Running Out of Food in the Last Year: Not on file    Stephon of Food in the Last Year: Not on file   Transportation Needs:     Lack of Transportation (Medical): Not on file    Lack of Transportation (Non-Medical):  Not on file   Physical Activity:     Days of Exercise per Week: Not on file    Minutes of Exercise per Session: Not on file   Stress:     Feeling of Stress : Not on file   Social Connections:     Frequency of Communication with Friends and Family: Not on file    Frequency of Social Gatherings with Friends and Family: Not on file    Attends [employer] Services: Not on file    Active Member of [address] [place] [address] [employer] or Organizations: Not on file    Attends Club or Organization Meetings: Not on file    Marital Status: Not on file   Intimate Partner Violence:     Fear of Current or Ex-Partner: Not on file    Emotionally Abused: Not on file    Physically Abused: Not on file    Sexually Abused: Not on file   Housing Stability:     Unable to Pay for Housing in the Last Year: Not on file    Number of [place] in the Last Year: Not on file    Unstable Housing in the Last Year: Not on file       Scheduled Meds:   carvedilol  25 mg Oral BID WC    budesonide-formoterol  2 puff Inhalation BID    atorvastatin  20 mg Oral Nightly    insulin glargine  38 Units SubCUTAneous Nightly    [Held by provider] losartan  100 mg Oral Daily    furosemide  20 mg IntraVENous BID    sodium chloride flush  10 mL IntraVENous 2 times per day    heparin (porcine)  5,000 Units SubCUTAneous 3 times per day    insulin lispro  0-6 Units SubCUTAneous TID WC    insulin lispro  0-3 Units SubCUTAneous Nightly    cefdinir  300 mg Oral Daily    doxycycline hyclate  100 mg Oral 2 times per day    hydrALAZINE  100 mg Oral 3 times per day     Continuous Infusions:   sodium chloride 65 mL/hr at 12/25/21 2117    dextrose      sodium chloride      niCARdipene (CARDENE) 50 mg in 250 mL 0.9 % sodium chloride infusion Stopped (12/23/21 1833)     PRN Meds:.lidocaine, albuterol sulfate HFA, traZODone, glucose, dextrose, glucagon (rDNA), dextrose, sodium chloride flush, sodium chloride, polyethylene glycol, acetaminophen **OR** acetaminophen, HYDROcodone 5 mg - acetaminophen, sodium chloride flush    ASSESSMENT:    Patient Active Problem List   Diagnosis    Emphysema [facility] lung ([facility] [address]    Diabetes mellitus ([facility] [address]    Essential hypertension    History of CVA (cerebrovascular accident)    Stage 3b chronic kidney disease ([facility] [address]    Chronic pain syndrome    Acute on chronic diastolic heart failure ([facility] [address]    Hypertensive emergency       PLAN:  Continue with beauchamp  When medically stable should have cysto and possible turp      Hamilton Mccain MD, M.D.  12/26/2021  8:06 AM

## 2021-12-26 NOTE — PROGRESS NOTES
[facility] MD Omid Horta MD. Jc Melendez MD  Progress Note    12/26/2021    SUBJECTIVE:   Off nicardipine drip   BP better  Cr up  On RA  D/w RN on floor     PROBLEM LIST:    Active Problems:    Hypertensive emergency  Resolved Problems:    * No resolved hospital problems. *       DIET:    ADULT DIET; Regular; No Added Salt (3-4 gm)       Allergies : Patient has no known allergies. Past Medical History:   Diagnosis Date    Bronchitis     recent, abx completed, has minimal cough at this time    Carpal tunnel syndrome, right     COPD (chronic obstructive pulmonary disease) ([facility])     [facility] [facility]     Foot drop, left     dt nerve damage    Hyperlipidemia     Hypertension     Neuropathy     left leg       Past Surgical History:   Procedure Laterality Date    APPENDECTOMY      BACK SURGERY  2012    lumbar    CARPAL TUNNEL RELEASE Right 04/26/2017    JOINT REPLACEMENT Bilateral 1989    knees       No family history on file. reports that he quit smoking about 52 years ago. He started smoking about 61 years ago. He has a 9.00 pack-year smoking history. His smokeless tobacco use includes snuff. He reports that he does not drink alcohol and does not use drugs. Review of Systems:   Constitutional: no fevers , no chills , feels ok   Eyes: no eye pain , no itching , no drainage  Ears, nose, mouth, throat, and face: no ear ,nose pain , hearing is ok ,no nasal drainage   Respiratory: no sob ,no cough ,no wheezing . Cardiovascular: no chest pain , no palpitation ,no sob . Gastrointestinal: no nausea, vomiting , constipation , no abdominal pain . Genitourinary:no urinary retention , no burning , dysuria . No polyuria   Hematologic/lymphatic: no bleeding , no cougulation issues . Musculoskeletal:no joint pain , no swelling . Neurological: no headaches ,no weakness , no numbness . Endocrine: no thirst , no weight issues .      MEDS (scheduled):    insulin glargine  25 Units SubCUTAneous Nightly    carvedilol  25 mg Oral BID WC    budesonide-formoterol  2 puff Inhalation BID    atorvastatin  20 mg Oral Nightly    [Held by provider] losartan  100 mg Oral Daily    furosemide  20 mg IntraVENous BID    sodium chloride flush  10 mL IntraVENous 2 times per day    heparin (porcine)  5,000 Units SubCUTAneous 3 times per day    insulin lispro  0-6 Units SubCUTAneous TID WC    insulin lispro  0-3 Units SubCUTAneous Nightly    cefdinir  300 mg Oral Daily    doxycycline hyclate  100 mg Oral 2 times per day    hydrALAZINE  100 mg Oral 3 times per day       MEDS (infusions):   sodium chloride 65 mL/hr at 12/25/21 2117    dextrose      sodium chloride      niCARdipene (CARDENE) 50 mg in 250 mL 0.9 % sodium chloride infusion Stopped (12/23/21 1833)       MEDS (prn):  lidocaine, albuterol sulfate HFA, traZODone, glucose, dextrose, glucagon (rDNA), dextrose, sodium chloride flush, sodium chloride, polyethylene glycol, acetaminophen **OR** acetaminophen, HYDROcodone 5 mg - acetaminophen, sodium chloride flush    PHYSICAL EXAM:     Patient Vitals for the past 24 hrs:   BP Temp Temp src Pulse Resp SpO2   12/26/21 0805 (!) 172/75 98.8 °F (37.1 °C) Axillary 64 17 97 %   12/26/21 0041 (!) 150/76 97.9 °F (36.6 °C) Oral 80 19 95 %   12/25/21 2120  99.9 °F (37.7 °C) Oral      12/25/21 1809 (!) 173/74   72     12/25/21 1447 (!) 137/58 98.8 °F (37.1 °C) Oral 60 18 96 %   @      Intake/Output Summary (Last 24 hours) at 12/26/2021 1237  Last data filed at 12/26/2021 1043  Gross per 24 hour   Intake 200 ml   Output 1450 ml   Net -1250 ml         Wt Readings from Last 3 Encounters:   12/25/21 223 lb 9.6 oz (101.4 kg)   12/15/21 240 lb (108.9 kg)   12/12/21 236 lb (107 kg)       Constitutional:  in no acute distress  Oral: mucus membranes moist  Neck: no JVD  Cardiovascular: S1, S2 regular rhythm, no murmur,or rub  Respiratory:  No crackles, no wheeze  Gastrointestinal:  Soft, nontender, nondistended, NABS  Ext: no edema, feet warm  Skin: dry, no rash  Neuro: awake, alert, interactive      DATA:    Recent Labs     12/24/21  0400 12/25/21  1244 12/26/21  0409   WBC 8.2 7.9 9.2   HGB 9.9* 10.7* 9.9*   HCT 33.0* 32.8* 31.0*   MCV 96.2 88.9 89.6    205 229     Recent Labs     12/24/21  0400 12/25/21  1244 12/26/21  0409    135 135   K 3.3* 3.8 3.9    100 99   CO2 21* 21* 22   MG 2.3  --   --    BUN 46* 53* 53*   CREATININE 3.3* 3.7* 3.9*       No results found for: LABPROT    ASSESSMENT / RECOMMENDATIONS:      1. Acute kidney injury. The patient's acute kidney injury is likely related to his  uncontrolled hypertension, which likely has been going on at least for  the last few weeks supported by clinical history of a blurry vision and  feeling tired for few weeks. 2.  Chronic kidney disease, stage IV. he is noticed to have nephrotic-range proteinuria, however, would  like to do workup and repeat proteinuria studies once the blood pressure  is under good control. If he continued to have nephrotic-range  proteinuria following having blood pressure under good control, then  this can be worked up, however, the _____ this is most likely related to  underlying diabetic nephropathy. 3.  COVID-19 positive. 4.  Hypertension urgency, currently on nicardipine drip. 5.  History of diastolic heart failure, compensated.     PLAN:       Cr going up (expected with better bp control as this can lead to decrease renal perfusion transiently )  Would hold losartan   Continue coreg to 25 mg bid / continue hydralzine ,amlodipine .    Start NS at 72 cc/hr   US results noted , will take the liberty and ask urology to see (not reviewed )  Bmp in am         Electronically signed by Komal Solis MD on 12/26/2021 at 12:37 PM

## 2021-12-27 VITALS
HEIGHT: 72 IN | OXYGEN SATURATION: 96 % | SYSTOLIC BLOOD PRESSURE: 201 MMHG | HEART RATE: 61 BPM | RESPIRATION RATE: 18 BRPM | DIASTOLIC BLOOD PRESSURE: 87 MMHG | WEIGHT: 225.3 LBS | TEMPERATURE: 97.7 F | BODY MASS INDEX: 30.51 KG/M2

## 2021-12-27 LAB
ANION GAP SERPL CALCULATED.3IONS-SCNC: 14 MMOL/L (ref 7–16)
BASOPHILS ABSOLUTE: 0.05 E9/L (ref 0–0.2)
BASOPHILS RELATIVE PERCENT: 0.6 % (ref 0–2)
BUN BLDV-MCNC: 54 MG/DL (ref 6–23)
BURR CELLS: ABNORMAL
CALCIUM SERPL-MCNC: 8.6 MG/DL (ref 8.6–10.2)
CHLORIDE BLD-SCNC: 103 MMOL/L (ref 98–107)
CO2: 21 MMOL/L (ref 22–29)
CREAT SERPL-MCNC: 3.8 MG/DL (ref 0.7–1.2)
EOSINOPHILS ABSOLUTE: 0.21 E9/L (ref 0.05–0.5)
EOSINOPHILS RELATIVE PERCENT: 2.4 % (ref 0–6)
GFR AFRICAN AMERICAN: 19
GFR NON-AFRICAN AMERICAN: 15 ML/MIN/1.73
GLUCOSE BLD-MCNC: 50 MG/DL (ref 74–99)
HCT VFR BLD CALC: 32.8 % (ref 37–54)
HEMOGLOBIN: 10.7 G/DL (ref 12.5–16.5)
IMMATURE GRANULOCYTES #: 0.04 E9/L
IMMATURE GRANULOCYTES %: 0.5 % (ref 0–5)
LYMPHOCYTES ABSOLUTE: 1.42 E9/L (ref 1.5–4)
LYMPHOCYTES RELATIVE PERCENT: 16.5 % (ref 20–42)
MCH RBC QN AUTO: 28.7 PG (ref 26–35)
MCHC RBC AUTO-ENTMCNC: 32.6 % (ref 32–34.5)
MCV RBC AUTO: 87.9 FL (ref 80–99.9)
METER GLUCOSE: 131 MG/DL (ref 74–99)
METER GLUCOSE: 136 MG/DL (ref 74–99)
METER GLUCOSE: 59 MG/DL (ref 74–99)
MONOCYTES ABSOLUTE: 1.56 E9/L (ref 0.1–0.95)
MONOCYTES RELATIVE PERCENT: 18.2 % (ref 2–12)
NEUTROPHILS ABSOLUTE: 5.31 E9/L (ref 1.8–7.3)
NEUTROPHILS RELATIVE PERCENT: 61.8 % (ref 43–80)
OVALOCYTES: ABNORMAL
PDW BLD-RTO: 14.6 FL (ref 11.5–15)
PLATELET # BLD: 232 E9/L (ref 130–450)
PMV BLD AUTO: 11.2 FL (ref 7–12)
POIKILOCYTES: ABNORMAL
POTASSIUM REFLEX MAGNESIUM: 3.6 MMOL/L (ref 3.5–5)
RBC # BLD: 3.73 E12/L (ref 3.8–5.8)
SCHISTOCYTES: ABNORMAL
SODIUM BLD-SCNC: 138 MMOL/L (ref 132–146)
TEAR DROP CELLS: ABNORMAL
TOXIC GRANULATION: ABNORMAL
WBC # BLD: 8.6 E9/L (ref 4.5–11.5)

## 2021-12-27 PROCEDURE — 36415 COLL VENOUS BLD VENIPUNCTURE: CPT

## 2021-12-27 PROCEDURE — 2580000003 HC RX 258: Performed by: INTERNAL MEDICINE

## 2021-12-27 PROCEDURE — 82962 GLUCOSE BLOOD TEST: CPT

## 2021-12-27 PROCEDURE — 6370000000 HC RX 637 (ALT 250 FOR IP): Performed by: INTERNAL MEDICINE

## 2021-12-27 PROCEDURE — 80048 BASIC METABOLIC PNL TOTAL CA: CPT

## 2021-12-27 PROCEDURE — 99239 HOSP IP/OBS DSCHRG MGMT >30: CPT | Performed by: INTERNAL MEDICINE

## 2021-12-27 PROCEDURE — 6360000002 HC RX W HCPCS: Performed by: INTERNAL MEDICINE

## 2021-12-27 PROCEDURE — 85025 COMPLETE CBC W/AUTO DIFF WBC: CPT

## 2021-12-27 PROCEDURE — 6360000002 HC RX W HCPCS: Performed by: NURSE PRACTITIONER

## 2021-12-27 RX ORDER — TAMSULOSIN HYDROCHLORIDE 0.4 MG/1
0.4 CAPSULE ORAL NIGHTLY
Qty: 30 CAPSULE | Refills: 3 | Status: SHIPPED | OUTPATIENT
Start: 2021-12-27

## 2021-12-27 RX ORDER — HYDRALAZINE HYDROCHLORIDE 100 MG/1
100 TABLET, FILM COATED ORAL EVERY 8 HOURS SCHEDULED
Qty: 90 TABLET | Refills: 3 | Status: SHIPPED | OUTPATIENT
Start: 2021-12-27

## 2021-12-27 RX ORDER — AMLODIPINE BESYLATE 5 MG/1
5 TABLET ORAL DAILY
Status: DISCONTINUED | OUTPATIENT
Start: 2021-12-27 | End: 2021-12-27 | Stop reason: HOSPADM

## 2021-12-27 RX ORDER — TAMSULOSIN HYDROCHLORIDE 0.4 MG/1
0.4 CAPSULE ORAL NIGHTLY
Status: DISCONTINUED | OUTPATIENT
Start: 2021-12-27 | End: 2021-12-27 | Stop reason: HOSPADM

## 2021-12-27 RX ORDER — CARVEDILOL 25 MG/1
25 TABLET ORAL 2 TIMES DAILY WITH MEALS
Qty: 60 TABLET | Refills: 3 | Status: SHIPPED | OUTPATIENT
Start: 2021-12-27

## 2021-12-27 RX ORDER — MORPHINE SULFATE 2 MG/ML
2 INJECTION, SOLUTION INTRAMUSCULAR; INTRAVENOUS ONCE
Status: COMPLETED | OUTPATIENT
Start: 2021-12-27 | End: 2021-12-27

## 2021-12-27 RX ORDER — AMLODIPINE BESYLATE 5 MG/1
5 TABLET ORAL DAILY
Qty: 30 TABLET | Refills: 3 | Status: SHIPPED | OUTPATIENT
Start: 2021-12-28 | End: 2022-01-20

## 2021-12-27 RX ORDER — DOXYCYCLINE HYCLATE 100 MG/1
100 CAPSULE ORAL EVERY 12 HOURS SCHEDULED
Qty: 6 CAPSULE | Refills: 0 | Status: SHIPPED | OUTPATIENT
Start: 2021-12-27 | End: 2021-12-30

## 2021-12-27 RX ORDER — CEFDINIR 300 MG/1
300 CAPSULE ORAL DAILY
Qty: 3 CAPSULE | Refills: 0 | Status: SHIPPED | OUTPATIENT
Start: 2021-12-28 | End: 2021-12-31

## 2021-12-27 RX ADMIN — HYDRALAZINE HYDROCHLORIDE 100 MG: 50 TABLET ORAL at 06:08

## 2021-12-27 RX ADMIN — HYDRALAZINE HYDROCHLORIDE 100 MG: 50 TABLET ORAL at 14:04

## 2021-12-27 RX ADMIN — Medication 10 ML: at 11:21

## 2021-12-27 RX ADMIN — FUROSEMIDE 20 MG: 10 INJECTION, SOLUTION INTRAMUSCULAR; INTRAVENOUS at 11:20

## 2021-12-27 RX ADMIN — MORPHINE SULFATE 2 MG: 2 INJECTION, SOLUTION INTRAMUSCULAR; INTRAVENOUS at 00:47

## 2021-12-27 RX ADMIN — HYDROCODONE BITARTRATE AND ACETAMINOPHEN 1 TABLET: 5; 325 TABLET ORAL at 12:05

## 2021-12-27 RX ADMIN — DOXYCYCLINE HYCLATE 100 MG: 100 CAPSULE ORAL at 11:20

## 2021-12-27 RX ADMIN — HEPARIN SODIUM 5000 UNITS: 10000 INJECTION INTRAVENOUS; SUBCUTANEOUS at 14:05

## 2021-12-27 RX ADMIN — SODIUM CHLORIDE: 9 INJECTION, SOLUTION INTRAVENOUS at 12:06

## 2021-12-27 RX ADMIN — CARVEDILOL 25 MG: 25 TABLET, FILM COATED ORAL at 11:20

## 2021-12-27 RX ADMIN — CEFDINIR 300 MG: 300 CAPSULE ORAL at 11:20

## 2021-12-27 RX ADMIN — AMLODIPINE BESYLATE 5 MG: 5 TABLET ORAL at 16:46

## 2021-12-27 RX ADMIN — HYDROCODONE BITARTRATE AND ACETAMINOPHEN 1 TABLET: 5; 325 TABLET ORAL at 05:48

## 2021-12-27 RX ADMIN — HEPARIN SODIUM 5000 UNITS: 10000 INJECTION INTRAVENOUS; SUBCUTANEOUS at 06:07

## 2021-12-27 RX ADMIN — BUDESONIDE AND FORMOTEROL FUMARATE DIHYDRATE 2 PUFF: 160; 4.5 AEROSOL RESPIRATORY (INHALATION) at 11:22

## 2021-12-27 ASSESSMENT — PAIN DESCRIPTION - LOCATION: LOCATION: HIP

## 2021-12-27 ASSESSMENT — PAIN SCALES - GENERAL
PAINLEVEL_OUTOF10: 7
PAINLEVEL_OUTOF10: 3
PAINLEVEL_OUTOF10: 10
PAINLEVEL_OUTOF10: 7
PAINLEVEL_OUTOF10: 7

## 2021-12-27 ASSESSMENT — PAIN DESCRIPTION - PROGRESSION: CLINICAL_PROGRESSION: NOT CHANGED

## 2021-12-27 ASSESSMENT — PAIN DESCRIPTION - ORIENTATION: ORIENTATION: LEFT

## 2021-12-27 NOTE — PROGRESS NOTES
Pts daughter Josephine Thomason called for an update and made me aware of pt living situation, pt lives at home independently and is a fall risk and still requires assistance moving around in room. She states she was POA in [place] and is worried about safety when he ia discharged. Would case management be able to contact her to set some thing up for discharge?  Manuel number is [phone]

## 2021-12-27 NOTE — DISCHARGE SUMMARY
[facility] Physician Discharge Summary       Rickey Alba  [address], [address] [facility] [address]  [address] [facility] 46, 159 [facility]  [phone]    Schedule an appointment as soon as possible for a visit      Judah Chin MD  [address] [place] [phone]    Schedule an appointment as soon as possible for a visit      Alyssa Ventura MD  [address]  [phone]    Schedule an appointment as soon as possible for a visit in 1 week        Activity level: As tolerated     Dispo: Left AMA    Condition on discharge: Not stable    Patient ID:  Dequan Pearson  [id number]  80 y.o.  1940    Admit date: 12/22/2021    Discharge date and time:  12/27/2021  6:04 PM    Admission Diagnoses: Active Problems:    Hypertensive emergency  Resolved Problems:    * No resolved hospital problems. *      Discharge Diagnoses: Active Problems:    Hypertensive emergency  Resolved Problems:    * No resolved hospital problems. *      Consults:  IP CONSULT TO CRITICAL CARE  IP CONSULT TO NEPHROLOGY  IP CONSULT TO UROLOGY  IP CONSULT TO IV TEAM    Hospital Course:   Patient Dequan Pearson is a 80 y.o. presented with Hypertensive emergency [I16.1]  Acute renal failure, unspecified acute renal failure type ([address] [N17.9]  Acute congestive heart failure, unspecified heart failure type ([address] [I50.9]    Hypertensive emergency. Presents with blood pressure of the 099 systolic despite treatment in the ED  Started on nicardipine and admitted to the ICU  Hydralazine increased to 100 mg 3 times daily. Continued on Norvasc, added coreg, increase coreg to 25 mg bid. Blood pressure still not controlled. Patient wanted to leave AMA, risks were explained to him that he was insisting to leave.     COVID-19 pneumonia  With no hypoxia.   Continue monitoring, if patient becomes hypoxic and started on steroids and discuss further treatment. Monitor inflammatory markers.     Bacterial pneumonia. Mild elevated procalcitonin. Although with no leukocytosis. On cefdinir and doxy and monitor.     Acute renal failure on chronic kidney disease stage III. Baseline creatinine around 2. Present with creatinine of 3.1. up to 3.8  Most likely due to hypertensive emergency  Consulted nephrology. Appreciate input. Patient did not want to stay and to have better readings     Hypoglycemia  BG 38, decrease lantus to 25 units        Diabetes type 2. Continue with Lantus and short-acting insulin.   Decrease lantus dose to 25 units     Leg calf pain with edema  Doppler ruled out DVT     Urinary retention  Bladder outlet obstruction  Ernst in place  Appreciate urology input  Patient will keep the Ernst catheter and    Discharge Exam:    General Appearance: alert and oriented to person, place and time and in no acute distress  Skin: warm and dry  Head: normocephalic and atraumatic  Eyes: pupils equal, round, and reactive to light, extraocular eye movements intact, conjunctivae normal  Neck: neck supple and non tender without mass   Pulmonary/Chest: clear to auscultation bilaterally- no wheezes, rales or rhonchi, normal air movement, no respiratory distress  Cardiovascular: normal rate, normal S1 and S2 and no carotid bruits  Abdomen: soft, non-tender, non-distended, normal bowel sounds, no masses or organomegaly  Extremities: no cyanosis, no clubbing and trace edema  Neurologic: no cranial nerve deficit and speech normal    I/O last 3 completed shifts:  In: -   Out: 1550 [Urine:1550]  I/O this shift:  In: -   Out: 1670 [Urine:1670]      LABS:  Recent Labs     12/25/21  1244 12/26/21  0409 12/27/21  0602    135 138   K 3.8 3.9 3.6    99 103   CO2 21* 22 21*   BUN 53* 53* 54*   CREATININE 3.7* 3.9* 3.8*   GLUCOSE 156* 38* 50*   CALCIUM 8.6 8.4* 8.6       Recent Labs     12/25/21  1244 12/26/21  0409 12/27/21  0602 WBC 7.9 9.2 8.6   RBC 3.69* 3.46* 3.73*   HGB 10.7* 9.9* 10.7*   HCT 32.8* 31.0* 32.8*   MCV 88.9 89.6 87.9   MCH 29.0 28.6 28.7   MCHC 32.6 31.9* 32.6   RDW 14.8 14.7 14.6    229 232   MPV 10.6 11.4 11.2       No results for input(s): POCGLU in the last 72 hours. Imaging:  CT HEAD WO CONTRAST    Result Date: 12/22/2021  EXAMINATION: CT OF THE HEAD WITHOUT CONTRAST  12/22/2021 8:49 pm TECHNIQUE: CT of the head was performed without the administration of intravenous contrast. Dose modulation, iterative reconstruction, and/or weight based adjustment of the mA/kV was utilized to reduce the radiation dose to as low as reasonably achievable. COMPARISON: None. HISTORY: ORDERING SYSTEM PROVIDED HISTORY: HTN/ HA TECHNOLOGIST PROVIDED HISTORY: Has a \"code stroke\" or \"stroke alert\" been called? ->No Reason for exam:->HTN/ HA Decision Support Exception - unselect if not a suspected or confirmed emergency medical condition->Emergency Medical Condition (MA) FINDINGS: BRAIN/VENTRICLES: There is no acute intracranial hemorrhage, mass effect or midline shift. No abnormal extra-axial fluid collection. The gray-white differentiation is maintained without evidence of an acute infarct. There is no evidence of hydrocephalus. ORBITS: The visualized portion of the orbits demonstrate no acute abnormality. SINUSES: Mucosal thickening in the bilateral maxillary, ethmoid and frontal sinuses. Bilateral mastoid air cells are clear. SOFT TISSUES/SKULL:  No acute abnormality of the visualized skull or soft tissues. No intracranial hemorrhage. Mucosal thickening in the sinuses may represent sinusitis. RECOMMENDATIONS: Unavailable     XR CHEST PORTABLE    Result Date: 12/22/2021  EXAMINATION: ONE XRAY VIEW OF THE CHEST 12/22/2021 11:19 pm COMPARISON: None.  HISTORY: ORDERING SYSTEM PROVIDED HISTORY: elevated BNP TECHNOLOGIST PROVIDED HISTORY: Reason for exam:->elevated BNP FINDINGS: The cardiac silhouette is at the upper limits of normal.  Scattered vascular calcifications. Prominent emphysematous changes. Fairly extensive coarsened interstitial opacities bilaterally. No pneumothorax. Scattered areas of scarring and/or atelectasis in the lung bases. Patchy ground-glass opacities are also suspected in the right lung. Old right clavicle fracture. Degenerative changes are present in the spine and shoulders. Patchy opacities in the right lung, concerning for developing infiltrates from pneumonia. Scattered areas of scarring and/or atelectasis in the lung bases. Coarsened interstitial opacities which could be chronic. Superimposition of acute process including interstitial edema or other atypical/viral infection also considered. Continued follow-up recommended. US RETROPERITONEAL COMPLETE    Result Date: 12/23/2021  EXAMINATION: RETROPERITONEAL ULTRASOUND OF THE KIDNEYS AND URINARY BLADDER 12/23/2021 COMPARISON: None HISTORY: ORDERING SYSTEM PROVIDED HISTORY: mani TECHNOLOGIST PROVIDED HISTORY: Reason for exam:->mani What reading provider will be dictating this exam?->CRC FINDINGS: Kidneys: The right kidney measures 10.3 cm in length and the left kidney measures 9.7 cm in length. Right kidney: Corticomedullary differentiation and cortical thickness is decreased. Anechoic thin walled nonvascular 3.8 cm right mid to upper pole renal cyst.  No concerning renal mass. No intrarenal calcifications. Mildly echogenic renal parenchyma. No hydronephrosis. Left kidney: Corticomedullary differentiation and cortical thickness is decreased. No renal mass, renal cysts, nor intrarenal calcifications. Mildly echogenic renal parenchyma there is no left hydronephrosis. Bladder: Bladder wall is diffusely thickened measuring 6-7 mm. Bilateral ureteric jets seen. Prevoid bladder volume was 337 mL with a postvoid residual of 234 mL. The enlarged prostate gland appears to be indenting the posterior and inferior wall the bladder.   Bladder mass cannot be entirely excluded. The prostate gland is enlarged, heterogeneous, and lobular measuring 5.4 x 5.0 x 4.3 cm.     1.  Bilateral renal cortical thinning. Echogenic renal parenchyma bilaterally. 3.8 cm right mid to upper pole renal cyst.  There is no hydronephrosis. 2.  Enlarged prostate gland. 3.  Diffusely thickened bladder wall. 234 mL postvoid residual.  Along the posteroinferior aspect of the bladder the prostate gland appears to be indenting the wall. A bladder mass cannot be entirely excluded. RECOMMENDATIONS: Suggest urology consultation for consideration of direct visualization of the bladder. US DUP LOWER EXTREMITIES BILATERAL VENOUS    Result Date: 12/23/2021  EXAMINATION: DUPLEX VENOUS ULTRASOUND OF THE BILATERAL LOWER [id number]/23/2021 5:37 pm TECHNIQUE: Duplex ultrasound using B-mode/gray scaled imaging, Doppler spectral analysis and color flow Doppler was obtained of the deep venous structures of the lower bilateral extremities. COMPARISON: None. HISTORY: ORDERING SYSTEM PROVIDED HISTORY: ? DVT TECHNOLOGIST PROVIDED HISTORY: Reason for exam:->? DVT What reading provider will be dictating this exam?->CRC FINDINGS: The visualized veins of the bilateral lower extremities are patent and free of echogenic thrombus. The veins demonstrate good compressibility with normal color flow study and spectral analysis. No evidence of DVT in either lower extremity.  RECOMMENDATIONS: Unavailable       Patient Instructions:      Medication List      START taking these medications    amLODIPine 5 MG tablet  Commonly known as: NORVASC  Take 1 tablet by mouth daily  Start taking on: December 28, 2021     carvedilol 25 MG tablet  Commonly known as: COREG  Take 1 tablet by mouth 2 times daily (with meals)     cefdinir 300 MG capsule  Commonly known as: OMNICEF  Take 1 capsule by mouth daily for 3 days  Start taking on: December 28, 2021     doxycycline hyclate 100 MG capsule  Commonly known as: VIBRAMYCIN  Take 1 capsule by mouth every 12 hours for 3 days     tamsulosin 0.4 MG capsule  Commonly known as: FLOMAX  Take 1 capsule by mouth nightly        CHANGE how you take these medications    albuterol (2.5 MG/3ML) 0.083% nebulizer solution  Commonly known as: PROVENTIL  What changed: Another medication with the same name was removed. Continue taking this medication, and follow the directions you see here. atorvastatin 20 MG tablet  Commonly known as: LIPITOR  What changed: Another medication with the same name was removed. Continue taking this medication, and follow the directions you see here. furosemide 20 MG tablet  Commonly known as: LASIX  What changed: Another medication with the same name was removed. Continue taking this medication, and follow the directions you see here.     gabapentin 300 MG capsule  Commonly known as: NEURONTIN  What changed: Another medication with the same name was removed. Continue taking this medication, and follow the directions you see here.     hydrALAZINE 100 MG tablet  Commonly known as: APRESOLINE  Take 1 tablet by mouth every 8 hours  What changed:   · medication strength  · See the new instructions. · Another medication with the same name was removed. Continue taking this medication, and follow the directions you see here. insulin lispro 100 UNIT/ML injection vial  Commonly known as: HUMALOG  What changed: Another medication with the same name was removed. Continue taking this medication, and follow the directions you see here. traZODone 150 MG tablet  Commonly known as: DESYREL  What changed: Another medication with the same name was removed. Continue taking this medication, and follow the directions you see here.         CONTINUE taking these medications    HYDROcodone-acetaminophen 5-325 MG per tablet  Commonly known as: NORCO     insulin glargine 100 UNIT/ML injection vial  Commonly known as: LANTUS        STOP taking these medications    fluticasone-salmeterol 500-50 MCG/DOSE diskus inhaler  Commonly known as: ADVAIR     losartan 100 MG tablet  Commonly known as: COZAAR     pramipexole 0.125 MG tablet  Commonly known as: MIRAPEX     traMADol 50 MG tablet  Commonly known as: ULTRAM           Where to Get Your Medications      These medications were sent to [address] [place] [address] [facility] [phone] Clinton Bull [phone]  [facility] Seth Buck, [address] [place] [address] Drive [address]    Phone: [phone]   · amLODIPine 5 MG tablet  · carvedilol 25 MG tablet  · cefdinir 300 MG capsule  · doxycycline hyclate 100 MG capsule  · hydrALAZINE 100 MG tablet  · tamsulosin 0.4 MG capsule           Note that more than 30 minutes was spent in preparing discharge papers, discussing discharge with patient, medication review, etc.    Signed:  Electronically signed by Rajni Bacon MD on 12/27/2021 at 6:04 PM

## 2021-12-27 NOTE — CARE COORDINATION
Social work / Discharge Planning:       [address]. Social work spoke to patient via phone for initial assessment. He lives alone and has a walker and canes. Patient is active with [facility] and has no history of [facility]. Patient is now on room air and does not have home oxygen. Per patient, discharge plan is home to resume services with [facility].     PT/OT evaluations ordered. Patient states that he will not consider [facility]. He is aware that the plan is to discharge home with beauchamp catheter and states that his son will assist as needed. ACP completed and patient states that he wants to be DNR status. Charge nurse updated. Patient only wants his son Davie Mask listed as a contact. Patient specifically informed social work that he does not want discharge planning discussed with his daughter Haider Castlean.    Electronically signed by TONY Loyola on 12/27/2021 at 2:23 PM

## 2021-12-27 NOTE — PROGRESS NOTES
12/27/2021 11:08 AM  Service: Urology  Group: [facility] ([place]/Ihsan/Kelsie)    Kalpesh Miller  [id number]    Subjective:   Awake and alert  Hard of hearing   No pain  Beauchamp draining carole urine   Pleasant     Review of Systems  Constitutional: No fever or chills   Respiratory: negative for cough and hemoptysis  Cardiovascular: negative for chest pain and dyspnea  Gastrointestinal: negative for abdominal pain, diarrhea, nausea and vomiting   : See above  Derm: negative for rash and skin lesion(s)  Neurological: negative for seizures and tremors  Musculoskeletal: Negative    Psychiatric: Negative   All other reviews are negative      Scheduled Meds:   insulin glargine  25 Units SubCUTAneous Nightly    carvedilol  25 mg Oral BID WC    budesonide-formoterol  2 puff Inhalation BID    atorvastatin  20 mg Oral Nightly    [Held by provider] losartan  100 mg Oral Daily    furosemide  20 mg IntraVENous BID    sodium chloride flush  10 mL IntraVENous 2 times per day    heparin (porcine)  5,000 Units SubCUTAneous 3 times per day    insulin lispro  0-6 Units SubCUTAneous TID WC    insulin lispro  0-3 Units SubCUTAneous Nightly    cefdinir  300 mg Oral Daily    doxycycline hyclate  100 mg Oral 2 times per day    hydrALAZINE  100 mg Oral 3 times per day       Objective:  Vitals:    12/27/21 0048   BP: (!) 165/78   Pulse: 72   Resp: 18   Temp: 97.8 °F (36.6 °C)   SpO2: 96%         Allergies: Patient has no known allergies.     General Appearance: alert and oriented to person, place and time and in no acute distress  Skin: no rash or erythema  Head: normocephalic and atraumatic  Pulmonary/Chest: normal air movement, no respiratory distress  Abdomen: soft, non-tender, non-distended  Genitourinary: beauchamp draining carole urine   Extremities: no cyanosis, clubbing or edema         Labs:     Recent Labs     12/27/21  0602      K 3.6      CO2 21*   BUN 54*   CREATININE 3.8*   GLUCOSE 50*   CALCIUM 8.6       Lab Results   Component Value Date    HGB 10.7 12/27/2021    HCT 32.8 12/27/2021       Lab Results   Component Value Date    PSA 2.24 12/25/2021     Narrative   EXAMINATION:   CT OF THE ABDOMEN AND PELVIS WITHOUT CONTRAST 12/25/2021 5:21 pm       TECHNIQUE:   CT of the abdomen and pelvis was performed without the administration of   intravenous contrast. Multiplanar reformatted images are provided for review. Dose modulation, iterative reconstruction, and/or weight based adjustment of   the mA/kV was utilized to reduce the radiation dose to as low as reasonably   achievable.       COMPARISON:   None.       HISTORY:   ORDERING SYSTEM PROVIDED HISTORY: azotemia   TECHNOLOGIST PROVIDED HISTORY:   Reason for exam:->azotemia       FINDINGS:   Lower Chest: There is mild dependent atelectasis at left lung base.       Organs: Within the limitations of a noncontrast exam, the visualized liver,   spleen, pancreas, adrenal glands and left kidney demonstrate no significant   abnormality.  4 cm low-density right renal lesion is likely a cyst.       GI/Bowel: There are no findings of intestinal obstruction.  The appendix is   not visualized.  There is sigmoid diverticulosis. Melva [place] is no acute   diverticulitis seen.       Pelvis: There is diffuse bladder wall thickening which could represent   cystitis. Melva Soraida is a Ernst catheter in the bladder.  There is no abnormal   pelvic mass or fluid collection seen.       Peritoneum/Retroperitoneum: There are aortoiliac atherosclerotic   calcification.  There is no abdominal aortic aneurysm.  There is no abnormal   lymphadenopathy seen.  There is no abnormal fluid collection.  There is no   free intraperitoneal air.       Bones/Soft Tissues: No acute abnormality.  Degenerative and postoperative   findings in the lumbar spine.           Impression   Diffuse bladder wall thickening.  Correlate for possible cystitis.       4 cm low-density right renal lesion is probably a cyst.       Sigmoid diverticulosis.  No findings of acute diverticulitis.       RECOMMENDATIONS:   Unavailable                     Assessment/Plan:  Urinary Retention (400ml)   HINOJOSA  CKD  Right Renal Cyst     Cont to watch the creatinine  Nephrology following   CT abdomen pelvis unremarkable for any obstructive uropathy   Urine cytology pending   Cont the beauchamp catheter  Start Flomax   Would benefit from cystoscopy and transurethral resection of the prostate when medically stable.  This can be pursued as an outpatient   Cont the beauchamp upon discharge  Will need outpatient follow up in about 1 to 2 weeks  Please call with additional questions or concerns     Dwayne Reyes, APRN - CNP   [facility]

## 2021-12-27 NOTE — PROGRESS NOTES
Patient stating he is wanting to leave AMA. Pt educated on the risks of leaving AMA. Pt verbalized understanding and still wishes to leave AMA. Dr. Floy Severe and Dr. Aster De León notified of patient's decision to leave AMA. Family called regarding patient's decision. Patient's son to  patient. Patient instructed to follow up with urology, nephrology and PCP. Educated on signs and symptoms of stroke. Patient verbalized understanding.

## 2021-12-27 NOTE — PROGRESS NOTES
[facility] Roberto A. Nicolette Music, MD Fleurette Look, MD Malinda Moritz, MD Cranston List, MD Forestine Hem, CNP   Edelmira Whiteside, NELL  Progress Note    12/27/2021    SUBJECTIVE:   12/26: Off nicardipine drip BP better Cr up On RA D/w RN on floor   12/27: Feeling better today. Denies all complaint. Hoping he can go home. Denies dyspnea at rest on room air. No cough wheeze or sputum production. PROBLEM LIST:    Active Problems:    Hypertensive emergency  Resolved Problems:    * No resolved hospital problems. *       DIET:    ADULT DIET; Regular; No Added Salt (3-4 gm)       Allergies : Patient has no known allergies. Past Medical History:   Diagnosis Date    Bronchitis     recent, abx completed, has minimal cough at this time    Carpal tunnel syndrome, right     COPD (chronic obstructive pulmonary disease) (HCC)     Diabetes ([facility] [address]     Foot drop, left     dt nerve damage    Hyperlipidemia     Hypertension     Neuropathy     left leg       Past Surgical History:   Procedure Laterality Date    APPENDECTOMY      BACK SURGERY  2012    lumbar    CARPAL TUNNEL RELEASE Right 04/26/2017    JOINT REPLACEMENT Bilateral 1989    knees       No family history on file. reports that he quit smoking about 52 years ago. He started smoking about 61 years ago. He has a 9.00 pack-year smoking history. His smokeless tobacco use includes snuff. He reports that he does not drink alcohol and does not use drugs. Review of Systems:   Constitutional: no fevers , no chills , feels ok   Eyes: no eye pain , no itching , no drainage  Ears, nose, mouth, throat, and face: no ear ,nose pain , hearing is ok ,no nasal drainage   Respiratory: no sob ,no cough ,no wheezing . Cardiovascular: no chest pain , no palpitation ,no sob . Gastrointestinal: no nausea, vomiting , constipation , no abdominal pain . Genitourinary:no urinary retention , no burning , dysuria .  No polyuria   Hematologic/lymphatic: no bleeding , no cougulation issues . Musculoskeletal:no joint pain , no swelling . Neurological: no headaches ,no weakness , no numbness . Endocrine: no thirst , no weight issues .      MEDS (scheduled):    tamsulosin  0.4 mg Oral Nightly    insulin glargine  25 Units SubCUTAneous Nightly    carvedilol  25 mg Oral BID WC    budesonide-formoterol  2 puff Inhalation BID    atorvastatin  20 mg Oral Nightly    [Held by provider] losartan  100 mg Oral Daily    furosemide  20 mg IntraVENous BID    sodium chloride flush  10 mL IntraVENous 2 times per day    heparin (porcine)  5,000 Units SubCUTAneous 3 times per day    insulin lispro  0-6 Units SubCUTAneous TID WC    insulin lispro  0-3 Units SubCUTAneous Nightly    cefdinir  300 mg Oral Daily    doxycycline hyclate  100 mg Oral 2 times per day    hydrALAZINE  100 mg Oral 3 times per day       MEDS (infusions):   sodium chloride 65 mL/hr at 12/27/21 1206    dextrose      sodium chloride      niCARdipene (CARDENE) 50 mg in 250 mL 0.9 % sodium chloride infusion Stopped (12/23/21 1833)       MEDS (prn):  lidocaine, albuterol sulfate HFA, traZODone, glucose, dextrose, glucagon (rDNA), dextrose, sodium chloride flush, sodium chloride, polyethylene glycol, acetaminophen **OR** acetaminophen, HYDROcodone 5 mg - acetaminophen, sodium chloride flush    PHYSICAL EXAM:     Patient Vitals for the past 24 hrs:   BP Temp Temp src Pulse Resp SpO2 Weight   12/27/21 1400 (!) 180/80         12/27/21 1110 (!) 184/70 98.3 °F (36.8 °C) Oral 62 18 96 %    12/27/21 0139       225 lb 4.8 oz (102.2 kg)   12/27/21 0048 (!) 165/78 97.8 °F (36.6 °C) Oral 72 18 96 %    12/26/21 2122 (!) 170/73         @      Intake/Output Summary (Last 24 hours) at 12/27/2021 1410  Last data filed at 12/27/2021 0602  Gross per 24 hour   Intake    Output 1550 ml   Net -1550 ml         Wt Readings from Last 3 Encounters:   12/27/21 225 lb 4.8 oz (102.2 kg)   12/15/21 240 lb (108.9 kg)   12/12/21 236 lb (107 kg)       Constitutional:  in no acute distress  Oral: mucus membranes moist  Neck: no JVD  Cardiovascular: S1, S2 regular rhythm, no murmur,or rub  Respiratory:  No crackles, no wheeze  Gastrointestinal:  Soft, nontender, nondistended, NABS  Ext: no edema, feet warm  Skin: dry, no rash  Neuro: awake, alert, interactive      DATA:    Recent Labs     12/25/21  1244 12/26/21  0409 12/27/21  0602   WBC 7.9 9.2 8.6   HGB 10.7* 9.9* 10.7*   HCT 32.8* 31.0* 32.8*   MCV 88.9 89.6 87.9    229 232     Recent Labs     12/25/21  1244 12/26/21  0409 12/27/21  0602    135 138   K 3.8 3.9 3.6    99 103   CO2 21* 22 21*   BUN 53* 53* 54*   CREATININE 3.7* 3.9* 3.8*       No results found for: LABPROT    Assessment    1. Acute kidney injury --- likely related to his uncontrolled hypertension, which likely has been going on at least for the last few weeks supported by clinical history of a blurry vision andfeeling tired for few weeks. 2.  Chronic kidney disease, stage IV. He has nephrotic-range proteinuria, however, would like to do workup and repeat proteinuria studies once the blood pressure is under good control. If he continues to have nephrotic-range proteinuria following having blood pressure under good control, then this can be worked up, however, the proteinuria is most likely related to underlying diabetic nephropathy, exacerbated by malignant hypertension    3. COVID-19 positive. 4.  Hypertension urgency, treat with nicardipine drip initially, now weaned off. BP improved, though still quite elevated    5. History of diastolic heart failure, compensated. Cr increase (expected with better bp control as this can lead to decrease renal perfusion transiently ), though has leveled off.  Obstructive uropathy may also be playing a role, though now status post Ernst    Recommendations  Continue to hold losartan   Continue coreg to 25 mg bid, hydralzine 100 mg 3 times daily  Resume amlodipine  Consider torsemide, though would not start it today  Stop IV fluid  US results noted , will take the liberty and ask urology to see (not reviewed )  Bmp in am         Electronically signed by Lam Abel MD on 12/27/2021

## 2021-12-27 NOTE — ACP (ADVANCE CARE PLANNING)
Advance Care Planning     Advance Care Planning Activator (Inpatient)  Conversation Note      Date of ACP Conversation: 12/27/2021     Conversation Conducted with: Patient with Decision Making Capacity    ACP Activator: Razia Capps, [address] [facility] Decision Maker:     Current Designated Health Care Decision Maker:     Primary Decision Maker: Dinh Tay - [phone]  Click here to complete Healthcare Decision Makers including section of the Healthcare Decision Maker Relationship (ie \"Primary\")  Today we documented Decision Maker(s) consistent with Legal Next of Kin hierarchy. Care Preferences    Ventilation: \"If you were in your present state of health and suddenly became very ill and were unable to breathe on your own, what would your preference be about the use of a ventilator (breathing machine) if it were available to you? \"      Would the patient desire the use of ventilator (breathing machine)?: no    \"If your health worsens and it becomes clear that your chance of recovery is unlikely, what would your preference be about the use of a ventilator (breathing machine) if it were available to you? \"     Would the patient desire the use of ventilator (breathing machine)?: No      Resuscitation  \"CPR works best to restart the heart when there is a sudden event, like a heart attack, in someone who is otherwise healthy. Unfortunately, CPR does not typically restart the heart for people who have serious health conditions or who are very sick. \"    \"In the event your heart stopped as a result of an underlying serious health condition, would you want attempts to be made to restart your heart (answer \"yes\" for attempt to resuscitate) or would you prefer a natural death (answer \"no\" for do not attempt to resuscitate)? \" no       [] Yes   [] No   Educated Patient / Stephanie Romeo regarding differences between Advance Directives and portable DNR orders.     Length of ACP Conversation in minutes: Conversation Outcomes:  [x] ACP discussion completed  [] Existing advance directive reviewed with patient; no changes to patient's previously recorded wishes  [] New Advance Directive completed  [] Portable Do Not Rescitate prepared for Provider review and signature  [] POLST/POST/MOLST/MOST prepared for Provider review and signature      Follow-up plan:    [] Schedule follow-up conversation to continue planning  [] Referred individual to Provider for additional questions/concerns   [] Advised patient/agent/surrogate to review completed ACP document and update if needed with changes in condition, patient preferences or care setting    [] This note routed to one or more involved healthcare providers

## 2022-01-04 ENCOUNTER — OFFICE VISIT (OUTPATIENT)
Dept: FAMILY MEDICINE CLINIC | Age: 82
End: 2022-01-04
Payer: MEDICARE

## 2022-01-04 ENCOUNTER — TELEPHONE (OUTPATIENT)
Dept: FAMILY MEDICINE CLINIC | Age: 82
End: 2022-01-04

## 2022-01-04 VITALS
BODY MASS INDEX: 31.15 KG/M2 | WEIGHT: 230 LBS | HEART RATE: 53 BPM | SYSTOLIC BLOOD PRESSURE: 156 MMHG | OXYGEN SATURATION: 94 % | HEIGHT: 72 IN | DIASTOLIC BLOOD PRESSURE: 66 MMHG | TEMPERATURE: 96.9 F

## 2022-01-04 DIAGNOSIS — N18.4 CHRONIC KIDNEY DISEASE, STAGE IV (SEVERE) (HCC): ICD-10-CM

## 2022-01-04 DIAGNOSIS — J43.9 PULMONARY EMPHYSEMA, UNSPECIFIED EMPHYSEMA TYPE (HCC): ICD-10-CM

## 2022-01-04 DIAGNOSIS — N18.32 STAGE 3B CHRONIC KIDNEY DISEASE (HCC): ICD-10-CM

## 2022-01-04 DIAGNOSIS — E53.8 VITAMIN B12 DEFICIENCY: ICD-10-CM

## 2022-01-04 DIAGNOSIS — I50.9 ACUTE CONGESTIVE HEART FAILURE, UNSPECIFIED HEART FAILURE TYPE (HCC): ICD-10-CM

## 2022-01-04 DIAGNOSIS — E11.9 TYPE 2 DIABETES MELLITUS WITHOUT COMPLICATION, WITH LONG-TERM CURRENT USE OF INSULIN (HCC): ICD-10-CM

## 2022-01-04 DIAGNOSIS — Z79.4 TYPE 2 DIABETES MELLITUS WITHOUT COMPLICATION, WITH LONG-TERM CURRENT USE OF INSULIN (HCC): ICD-10-CM

## 2022-01-04 DIAGNOSIS — G89.4 CHRONIC PAIN SYNDROME: Primary | ICD-10-CM

## 2022-01-04 LAB
ALBUMIN SERPL-MCNC: 3.3 G/DL (ref 3.5–5.2)
ALP BLD-CCNC: 63 U/L (ref 40–129)
ALT SERPL-CCNC: 11 U/L (ref 0–40)
ANION GAP SERPL CALCULATED.3IONS-SCNC: 14 MMOL/L (ref 7–16)
AST SERPL-CCNC: 16 U/L (ref 0–39)
BASOPHILS ABSOLUTE: 0.05 E9/L (ref 0–0.2)
BASOPHILS RELATIVE PERCENT: 0.8 % (ref 0–2)
BILIRUB SERPL-MCNC: 0.2 MG/DL (ref 0–1.2)
BUN BLDV-MCNC: 55 MG/DL (ref 6–23)
CALCIUM SERPL-MCNC: 9.2 MG/DL (ref 8.6–10.2)
CHLORIDE BLD-SCNC: 99 MMOL/L (ref 98–107)
CO2: 22 MMOL/L (ref 22–29)
CREAT SERPL-MCNC: 3.8 MG/DL (ref 0.7–1.2)
EOSINOPHILS ABSOLUTE: 0.29 E9/L (ref 0.05–0.5)
EOSINOPHILS RELATIVE PERCENT: 4.5 % (ref 0–6)
GFR AFRICAN AMERICAN: 19
GFR NON-AFRICAN AMERICAN: 15 ML/MIN/1.73
GLUCOSE BLD-MCNC: 160 MG/DL (ref 74–99)
HBA1C MFR BLD: 6.5 % (ref 4–5.6)
HCT VFR BLD CALC: 32.7 % (ref 37–54)
HEMOGLOBIN: 10.1 G/DL (ref 12.5–16.5)
IMMATURE GRANULOCYTES #: 0.02 E9/L
IMMATURE GRANULOCYTES %: 0.3 % (ref 0–5)
LYMPHOCYTES ABSOLUTE: 1.17 E9/L (ref 1.5–4)
LYMPHOCYTES RELATIVE PERCENT: 18.1 % (ref 20–42)
MCH RBC QN AUTO: 28.5 PG (ref 26–35)
MCHC RBC AUTO-ENTMCNC: 30.9 % (ref 32–34.5)
MCV RBC AUTO: 92.4 FL (ref 80–99.9)
MONOCYTES ABSOLUTE: 0.81 E9/L (ref 0.1–0.95)
MONOCYTES RELATIVE PERCENT: 12.5 % (ref 2–12)
NEUTROPHILS ABSOLUTE: 4.12 E9/L (ref 1.8–7.3)
NEUTROPHILS RELATIVE PERCENT: 63.8 % (ref 43–80)
PDW BLD-RTO: 14.2 FL (ref 11.5–15)
PLATELET # BLD: 269 E9/L (ref 130–450)
PMV BLD AUTO: 11.4 FL (ref 7–12)
POTASSIUM SERPL-SCNC: 4.7 MMOL/L (ref 3.5–5)
RBC # BLD: 3.54 E12/L (ref 3.8–5.8)
SODIUM BLD-SCNC: 135 MMOL/L (ref 132–146)
TOTAL PROTEIN: 6.5 G/DL (ref 6.4–8.3)
VITAMIN B-12: 713 PG/ML (ref 211–946)
WBC # BLD: 6.5 E9/L (ref 4.5–11.5)

## 2022-01-04 PROCEDURE — 99214 OFFICE O/P EST MOD 30 MIN: CPT | Performed by: FAMILY MEDICINE

## 2022-01-04 PROCEDURE — G8484 FLU IMMUNIZE NO ADMIN: HCPCS | Performed by: FAMILY MEDICINE

## 2022-01-04 PROCEDURE — 4040F PNEUMOC VAC/ADMIN/RCVD: CPT | Performed by: FAMILY MEDICINE

## 2022-01-04 PROCEDURE — G8427 DOCREV CUR MEDS BY ELIG CLIN: HCPCS | Performed by: FAMILY MEDICINE

## 2022-01-04 PROCEDURE — 1111F DSCHRG MED/CURRENT MED MERGE: CPT | Performed by: FAMILY MEDICINE

## 2022-01-04 PROCEDURE — 1123F ACP DISCUSS/DSCN MKR DOCD: CPT | Performed by: FAMILY MEDICINE

## 2022-01-04 PROCEDURE — G8417 CALC BMI ABV UP PARAM F/U: HCPCS | Performed by: FAMILY MEDICINE

## 2022-01-04 PROCEDURE — 4004F PT TOBACCO SCREEN RCVD TLK: CPT | Performed by: FAMILY MEDICINE

## 2022-01-04 PROCEDURE — 3023F SPIROM DOC REV: CPT | Performed by: FAMILY MEDICINE

## 2022-01-04 RX ORDER — HYDROCODONE BITARTRATE AND ACETAMINOPHEN 5; 325 MG/1; MG/1
1 TABLET ORAL EVERY 12 HOURS PRN
Qty: 60 TABLET | Refills: 0 | Status: SHIPPED | OUTPATIENT
Start: 2022-01-04 | End: 2022-02-03

## 2022-01-04 NOTE — PROGRESS NOTES
[address] presents to the office today for   Chief Complaint   Patient presents with    Follow-Up from Hospital     Admitted recently for hypertensive urgency  Signed himself out AMA  Med adjustments done by nephrology including Hydralazine increased to 100 mg TID  Continued Norvasc  Added Coreg and increased up to 25 mg bid    COVID19  Dx on 12/27  Feeling ok     Acute on chronic kidney disease  Most likely due to hypertensive urgency  Needs repeat check today  He has appt with nephrology he reports    Chronic hip pain  [place] is helping  Seeks refill    Diabetes Type 2  He was hypoglycemic in hospital  Lantus decreased to 25 units  Reports sugars are controlled    Urinary retention  Ernst placed in hospital   Still in place  He reports he has appt with Urology upcoming    Review of Systems     BP (!) 156/66   Pulse 53   Temp 96.9 °F (36.1 °C) (Temporal)   Ht 6' (1.829 m)   Wt 230 lb (104.3 kg)   SpO2 94%   BMI 31.19 kg/m²   Physical Exam  Constitutional:       Appearance: Normal appearance. HENT:      Head: Normocephalic and atraumatic. Eyes:      Extraocular Movements: Extraocular movements intact. Conjunctiva/sclera: Conjunctivae normal.   Cardiovascular:      Rate and Rhythm: Normal rate. Heart sounds: Normal heart sounds. Pulmonary:      Effort: Pulmonary effort is normal.      Breath sounds: Normal breath sounds. Skin:     General: Skin is warm. Neurological:      Mental Status: He is alert and oriented to person, place, and time. Psychiatric:         Mood and Affect: Mood normal.         Behavior: Behavior normal.            Current Outpatient Medications:     HYDROcodone-acetaminophen (NORCO) 5-325 MG per tablet, Take 1 tablet by mouth every 12 hours as needed for Pain for up to 30 days. , Disp: 60 tablet, Rfl: 0    hydrALAZINE (APRESOLINE) 100 MG tablet, Take 1 tablet by mouth every 8 hours, Disp: 90 tablet, Rfl: 3    carvedilol (COREG) 25 MG tablet, Take 1 tablet by mouth 2 times daily (with meals), Disp: 60 tablet, Rfl: 3    amLODIPine (NORVASC) 5 MG tablet, Take 1 tablet by mouth daily, Disp: 30 tablet, Rfl: 3    tamsulosin (FLOMAX) 0.4 MG capsule, Take 1 capsule by mouth nightly, Disp: 30 capsule, Rfl: 3    atorvastatin (LIPITOR) 20 MG tablet, Take 20 mg by mouth daily, Disp: , Rfl:     albuterol (PROVENTIL) (2.5 MG/3ML) 0.083% nebulizer solution, Take 2.5 mg by nebulization every 6 hours as needed for Wheezing, Disp: , Rfl:     furosemide (LASIX) 20 MG tablet, Take 20 mg by mouth daily, Disp: , Rfl:     gabapentin (NEURONTIN) 300 MG capsule, Take 300 mg by mouth nightly., Disp: , Rfl:     insulin glargine (LANTUS) 100 UNIT/ML injection vial, Inject 18 Units into the skin nightly, Disp: , Rfl:     insulin lispro (HUMALOG) 100 UNIT/ML injection vial, Inject 0-12 Units into the skin 3 times daily (before meals) PER SLIDING SCALE: 0-179=0U, 180-200=2U, 201-230=4U, 231-280=6U, 281-300=8U, 301-350=10U, 350-400=12U, >400=CALL MD, Disp: , Rfl:     traZODone (DESYREL) 150 MG tablet, Take 150 mg by mouth nightly, Disp: , Rfl:      Past Medical History:   Diagnosis Date    Bronchitis     recent, abx completed, has minimal cough at this time    Carpal tunnel syndrome, right     COPD (chronic obstructive pulmonary disease) (HCC)     Diabetes (HCC)     Foot drop, left     dt nerve damage    Hyperlipidemia     Hypertension     Neuropathy     left leg       Lona Langston was seen today for follow-up from hospital.    Diagnoses and all orders for this visit:    Chronic pain syndrome  -     HYDROcodone-acetaminophen (NORCO) 5-325 MG per tablet; Take 1 tablet by mouth every 12 hours as needed for Pain for up to 30 days. Chronic kidney disease, stage IV (severe) ([facility])  -     CBC Auto Differential; Future  -     Comprehensive Metabolic Panel;  Future    Pulmonary emphysema, unspecified emphysema type ([facility])    Acute congestive heart failure, unspecified heart failure type ([facility] [address]    Type 2 diabetes mellitus without complication, with long-term current use of insulin (HCC)  -     CBC Auto Differential; Future  -     Comprehensive Metabolic Panel; Future    Stage 3b chronic kidney disease (HCC)  -     CBC Auto Differential; Future  -     Comprehensive Metabolic Panel;  Future       F/u with specialists  Refill [address] [place] [address] today to confirm no severe kidney disease  Defer BP meds to nephrology  Recheck 3 months    Fercho Chavis MD

## 2022-01-06 ENCOUNTER — TELEPHONE (OUTPATIENT)
Dept: FAMILY MEDICINE CLINIC | Age: 82
End: 2022-01-06

## 2022-01-06 NOTE — TELEPHONE ENCOUNTER
ENEIDA huston calling she saw him on Monday at 2:00p.m. at that time his bp was 153/71, he was asymptomatic. She was just now able to report that BP reading.

## 2022-01-12 ENCOUNTER — TELEPHONE (OUTPATIENT)
Dept: FAMILY MEDICINE CLINIC | Age: 82
End: 2022-01-12

## 2022-01-12 NOTE — TELEPHONE ENCOUNTER
[facility] Physical Therapy        He is just finishing up with the patient and wanted to report vitals. BP - 135/55      Temp - 98.9      Pulse - 62      Resp - 14          Pulse OX - 88 & 86         He has an appointment later today with a urologist to have the catheter removed. Please call his son Ivette Hernandez with any follow up information.   [phone]

## 2022-01-12 NOTE — TELEPHONE ENCOUNTER
I spoke to his son Phylicia Zhang who is on his way to his house right now. He said he will tell him the doctor wants him to go to the ER because the oxygen levels are too low.

## 2022-01-13 LAB
CHLORIDE URINE: 83 MEQ/L
IRON SATURATION: 14 % (ref 15–55)
IRON: 28 UG/DL (ref 65–175)
PHOSPHORUS: 3.6 MG/DL (ref 2.5–4.6)
POTASSIUM, URINE: 28.5 MEQ/L
SODIUM URINE: 81 MEQ/L
TOTAL IRON BINDING CAPACITY: 207 UG/DL (ref 250–450)
TRANSFERRIN: 148 MG/DL (ref 180–329)

## 2022-01-15 LAB — PARATHYROID HORMONE INTACT: 45 PG/ML (ref 15–65)

## 2022-01-17 LAB — VITAMIN D 1,25-DIHYDROXY: <5 PG/ML (ref 19.9–79.3)

## 2022-01-20 ENCOUNTER — OFFICE VISIT (OUTPATIENT)
Dept: FAMILY MEDICINE CLINIC | Age: 82
End: 2022-01-20
Payer: MEDICARE

## 2022-01-20 VITALS
SYSTOLIC BLOOD PRESSURE: 160 MMHG | BODY MASS INDEX: 31.15 KG/M2 | HEART RATE: 60 BPM | DIASTOLIC BLOOD PRESSURE: 66 MMHG | WEIGHT: 230 LBS | TEMPERATURE: 97.1 F | OXYGEN SATURATION: 95 % | HEIGHT: 72 IN

## 2022-01-20 DIAGNOSIS — I10 ESSENTIAL HYPERTENSION: ICD-10-CM

## 2022-01-20 DIAGNOSIS — N18.4 CHRONIC KIDNEY DISEASE, STAGE IV (SEVERE) (HCC): Primary | ICD-10-CM

## 2022-01-20 LAB
25-HYDROXY VITAMIN D-2: 4.5 NG/ML
25-HYDROXY VITAMIN D-3: 19 NG/ML
VITAMIN D 25-HYDROXY: 24 NG/ML

## 2022-01-20 PROCEDURE — 99214 OFFICE O/P EST MOD 30 MIN: CPT | Performed by: FAMILY MEDICINE

## 2022-01-20 PROCEDURE — 1123F ACP DISCUSS/DSCN MKR DOCD: CPT | Performed by: FAMILY MEDICINE

## 2022-01-20 PROCEDURE — G8427 DOCREV CUR MEDS BY ELIG CLIN: HCPCS | Performed by: FAMILY MEDICINE

## 2022-01-20 PROCEDURE — G8484 FLU IMMUNIZE NO ADMIN: HCPCS | Performed by: FAMILY MEDICINE

## 2022-01-20 PROCEDURE — 1111F DSCHRG MED/CURRENT MED MERGE: CPT | Performed by: FAMILY MEDICINE

## 2022-01-20 PROCEDURE — 4040F PNEUMOC VAC/ADMIN/RCVD: CPT | Performed by: FAMILY MEDICINE

## 2022-01-20 PROCEDURE — G8417 CALC BMI ABV UP PARAM F/U: HCPCS | Performed by: FAMILY MEDICINE

## 2022-01-20 PROCEDURE — 4004F PT TOBACCO SCREEN RCVD TLK: CPT | Performed by: FAMILY MEDICINE

## 2022-01-20 RX ORDER — AMLODIPINE BESYLATE 10 MG/1
TABLET ORAL
COMMUNITY
Start: 2022-01-18

## 2022-01-20 RX ORDER — FAMOTIDINE 20 MG/1
TABLET, FILM COATED ORAL
COMMUNITY
Start: 2022-01-18

## 2022-01-20 NOTE — PROGRESS NOTES
[address] presents to the office today for   Chief Complaint   Patient presents with    Follow-Up from Hospital     Pt poor historian  I do not have notes from hospital but can see labs  [facility] is coming to the house, he is getting therapy at home as well    Hypertension  Recently hospitalized at [facility]  They increased Amlodipine 10 mg po qd  Reports he is taking his medication    CKD IV  Sees nephrology 2/2  GFR appears to have recovered to about 19 in hospital    Review of Systems     BP (!) 172/66   Pulse 60   Temp 97.1 °F (36.2 °C) (Temporal)   Ht 6' (1.829 m)   Wt 230 lb (104.3 kg)   SpO2 95%   BMI 31.19 kg/m²   Physical Exam  Constitutional:       Appearance: Normal appearance. HENT:      Head: Normocephalic and atraumatic. Eyes:      Extraocular Movements: Extraocular movements intact. Conjunctiva/sclera: Conjunctivae normal.   Cardiovascular:      Rate and Rhythm: Normal rate. Heart sounds: Normal heart sounds. Pulmonary:      Effort: Pulmonary effort is normal.      Breath sounds: Normal breath sounds. Skin:     General: Skin is warm. Neurological:      Mental Status: He is alert and oriented to person, place, and time. Psychiatric:         Mood and Affect: Mood normal.         Behavior: Behavior normal.            Current Outpatient Medications:     amLODIPine (NORVASC) 10 MG tablet, take 1 tablet by mouth once daily, Disp: , Rfl:     famotidine (PEPCID) 20 MG tablet, take 1 tablet by mouth at bedtime, Disp: , Rfl:     HYDROcodone-acetaminophen (NORCO) 5-325 MG per tablet, Take 1 tablet by mouth every 12 hours as needed for Pain for up to 30 days. , Disp: 60 tablet, Rfl: 0    hydrALAZINE (APRESOLINE) 100 MG tablet, Take 1 tablet by mouth every 8 hours, Disp: 90 tablet, Rfl: 3    carvedilol (COREG) 25 MG tablet, Take 1 tablet by mouth 2 times daily (with meals), Disp: 60 tablet, Rfl: 3    tamsulosin (FLOMAX) 0.4 MG capsule, Take 1 capsule by mouth nightly, Disp: 30 capsule, Rfl: 3    atorvastatin (LIPITOR) 20 MG tablet, Take 20 mg by mouth daily, Disp: , Rfl:     albuterol (PROVENTIL) (2.5 MG/3ML) 0.083% nebulizer solution, Take 2.5 mg by nebulization every 6 hours as needed for Wheezing, Disp: , Rfl:     furosemide (LASIX) 20 MG tablet, Take 20 mg by mouth daily, Disp: , Rfl:     gabapentin (NEURONTIN) 300 MG capsule, Take 300 mg by mouth nightly., Disp: , Rfl:     insulin glargine (LANTUS) 100 UNIT/ML injection vial, Inject 18 Units into the skin nightly, Disp: , Rfl:     insulin lispro (HUMALOG) 100 UNIT/ML injection vial, Inject 0-12 Units into the skin 3 times daily (before meals) PER SLIDING SCALE: 0-179=0U, 180-200=2U, 201-230=4U, 231-280=6U, 281-300=8U, 301-350=10U, 350-400=12U, >400=CALL MD, Disp: , Rfl:     traZODone (DESYREL) 150 MG tablet, Take 150 mg by mouth nightly, Disp: , Rfl:      Past Medical History:   Diagnosis Date    Bronchitis     recent, abx completed, has minimal cough at this time    Carpal tunnel syndrome, right     COPD (chronic obstructive pulmonary disease) (HCC)     Diabetes (HCC)     Foot drop, left     dt nerve damage    Hyperlipidemia     Hypertension     Neuropathy     left leg       Wilmer Hidden was seen today for follow-up from hospital.    Diagnoses and all orders for this visit:    Chronic kidney disease, stage IV (severe) ([facility] [address]    Essential hypertension       Request records from hospital  No medication changes  F/u with nephrology as planned    Jesus Alberto Alberto MD

## 2022-01-25 ENCOUNTER — TELEPHONE (OUTPATIENT)
Dept: FAMILY MEDICINE CLINIC | Age: 82
End: 2022-01-25

## 2022-01-25 NOTE — TELEPHONE ENCOUNTER
I spoke to his son Marcia Ashley and explained that Medicare will not cover a nursing home stay unless admitted straight from the hospital.    He understands that this is something they will have to pay for, and cant do that. He is asking what you recommend in this case since home care visits do not seem to be enough?

## 2022-01-25 NOTE — TELEPHONE ENCOUNTER
If he cannot care for him he needs to take him to ER for possible admission/placement. I would have him call his home health nurse to see if she can come back and assist with [place].

## 2022-01-25 NOTE — TELEPHONE ENCOUNTER
I spoke Alfredo Oliveros, the occupational therapist who was out there today. She said that she was aware of the cath bag and already contacted the office to send a nurse to the home today with new supplies. She is gong to call them back now and be sure that a nurse goes out there right away.

## 2022-01-25 NOTE — TELEPHONE ENCOUNTER
[address] [facility]         [phone]      She is calling from the patient home asking about getting him into a facility for short term respite stay. He was back to the ER of Friday after a fall. He is doing ok, but continues to end up in the ER and feeling like he needs to be where someone can help him for brief period. He wants to go to a place in [facility]. He does not know the name, but could be [place]. Please advise.

## 2022-01-26 ENCOUNTER — TELEPHONE (OUTPATIENT)
Dept: FAMILY MEDICINE CLINIC | Age: 82
End: 2022-01-26

## 2022-01-26 NOTE — TELEPHONE ENCOUNTER
Rickie Espinoza a physical therapist with [facility] calling to update you; he will see him once this week. Then 3times a week for two weeks.

## 2022-01-26 NOTE — TELEPHONE ENCOUNTER
I just spoke to Sara Antoni. His Dad is doing some better. The nurse came out late yesterday and changed the catheter. He is not going to pursue a facility placement at this time. They will do the best they can at home for now.

## 2022-01-27 ENCOUNTER — TELEPHONE (OUTPATIENT)
Dept: FAMILY MEDICINE CLINIC | Age: 82
End: 2022-01-27

## 2022-01-27 NOTE — TELEPHONE ENCOUNTER
Salud Davila stated on 1/25/22 is when they saw him and his blood pressure was 210/88. She said they have not seen him since then.

## 2022-01-27 NOTE — TELEPHONE ENCOUNTER
Nico Staley the nurse with [facility] was notified. She is going to notify the patient.

## 2022-01-27 NOTE — TELEPHONE ENCOUNTER
Patient is being seen by [facility]. he denies chest pain, no coughing, or shortness of breath. He has swelling in both hands and feet. He is compliant taking lasix 20mg daily. Please advise.

## 2022-02-18 RX ORDER — ATORVASTATIN CALCIUM 20 MG/1
TABLET, FILM COATED ORAL
Qty: 30 TABLET | Refills: 5 | Status: SHIPPED | OUTPATIENT
Start: 2022-02-18

## 2022-04-27 RX ORDER — TRAZODONE HYDROCHLORIDE 150 MG/1
TABLET ORAL
Qty: 90 TABLET | OUTPATIENT
Start: 2022-04-27

## 2022-07-15 ENCOUNTER — TELEPHONE (OUTPATIENT)
Dept: FAMILY MEDICINE CLINIC | Age: 82
End: 2022-07-15

## 2022-07-15 NOTE — TELEPHONE ENCOUNTER
Spoke to patients son as patient was due for appt and check on current status. Son states that the patient had passed away in February of 2022.